# Patient Record
Sex: FEMALE | Race: WHITE | NOT HISPANIC OR LATINO | Employment: UNEMPLOYED | URBAN - METROPOLITAN AREA
[De-identification: names, ages, dates, MRNs, and addresses within clinical notes are randomized per-mention and may not be internally consistent; named-entity substitution may affect disease eponyms.]

---

## 2021-08-09 ENCOUNTER — HOSPITAL ENCOUNTER (EMERGENCY)
Facility: HOSPITAL | Age: 61
Discharge: HOME | End: 2021-08-09
Attending: EMERGENCY MEDICINE
Payer: MEDICAID

## 2021-08-09 VITALS
HEART RATE: 76 BPM | DIASTOLIC BLOOD PRESSURE: 73 MMHG | HEIGHT: 65 IN | WEIGHT: 159 LBS | OXYGEN SATURATION: 99 % | BODY MASS INDEX: 26.49 KG/M2 | RESPIRATION RATE: 20 BRPM | TEMPERATURE: 98 F | SYSTOLIC BLOOD PRESSURE: 128 MMHG

## 2021-08-09 DIAGNOSIS — U07.1 COVID-19 VIRUS INFECTION: Primary | ICD-10-CM

## 2021-08-09 LAB — SARS-COV-2 RDRP RESP QL NAA+PROBE: POSITIVE

## 2021-08-09 PROCEDURE — 99283 EMERGENCY DEPT VISIT LOW MDM: CPT | Mod: 25

## 2021-08-09 PROCEDURE — 99900035 HC TECH TIME PER 15 MIN (STAT)

## 2021-08-09 PROCEDURE — 25000242 PHARM REV CODE 250 ALT 637 W/ HCPCS: Performed by: EMERGENCY MEDICINE

## 2021-08-09 PROCEDURE — 94761 N-INVAS EAR/PLS OXIMETRY MLT: CPT

## 2021-08-09 PROCEDURE — 94640 AIRWAY INHALATION TREATMENT: CPT

## 2021-08-09 PROCEDURE — U0002 COVID-19 LAB TEST NON-CDC: HCPCS | Performed by: EMERGENCY MEDICINE

## 2021-08-09 PROCEDURE — 99900031 HC PATIENT EDUCATION (STAT)

## 2021-08-09 RX ORDER — ALBUTEROL SULFATE 90 UG/1
2 AEROSOL, METERED RESPIRATORY (INHALATION) EVERY 8 HOURS
Status: DISCONTINUED | OUTPATIENT
Start: 2021-08-09 | End: 2021-08-09 | Stop reason: HOSPADM

## 2021-08-09 RX ADMIN — ALBUTEROL SULFATE 2 PUFF: 90 AEROSOL, METERED RESPIRATORY (INHALATION) at 01:08

## 2021-08-12 ENCOUNTER — INFUSION (OUTPATIENT)
Dept: INFECTIOUS DISEASES | Facility: HOSPITAL | Age: 61
End: 2021-08-12
Attending: EMERGENCY MEDICINE
Payer: MEDICAID

## 2021-08-12 VITALS
RESPIRATION RATE: 14 BRPM | SYSTOLIC BLOOD PRESSURE: 135 MMHG | HEART RATE: 71 BPM | DIASTOLIC BLOOD PRESSURE: 73 MMHG | TEMPERATURE: 98 F | OXYGEN SATURATION: 99 %

## 2021-08-12 DIAGNOSIS — U07.1 COVID-19 VIRUS INFECTION: ICD-10-CM

## 2021-08-12 PROCEDURE — M0243 CASIRIVI AND IMDEVI INFUSION: HCPCS | Performed by: EMERGENCY MEDICINE

## 2021-08-12 PROCEDURE — 25000003 PHARM REV CODE 250: Performed by: EMERGENCY MEDICINE

## 2021-08-12 PROCEDURE — 63600175 PHARM REV CODE 636 W HCPCS: Performed by: EMERGENCY MEDICINE

## 2021-08-12 RX ORDER — SODIUM CHLORIDE 0.9 % (FLUSH) 0.9 %
10 SYRINGE (ML) INJECTION
Status: ACTIVE | OUTPATIENT
Start: 2021-08-12

## 2021-08-12 RX ORDER — ALBUTEROL SULFATE 90 UG/1
2 AEROSOL, METERED RESPIRATORY (INHALATION)
Status: DISPENSED | OUTPATIENT
Start: 2021-08-12

## 2021-08-12 RX ORDER — DIPHENHYDRAMINE HYDROCHLORIDE 50 MG/ML
25 INJECTION INTRAMUSCULAR; INTRAVENOUS ONCE AS NEEDED
Status: DISPENSED | OUTPATIENT
Start: 2021-08-12 | End: 2033-01-08

## 2021-08-12 RX ORDER — ONDANSETRON 4 MG/1
4 TABLET, ORALLY DISINTEGRATING ORAL ONCE AS NEEDED
Status: DISPENSED | OUTPATIENT
Start: 2021-08-12 | End: 2033-01-08

## 2021-08-12 RX ORDER — EPINEPHRINE 0.3 MG/.3ML
0.3 INJECTION SUBCUTANEOUS
Status: DISPENSED | OUTPATIENT
Start: 2021-08-12

## 2021-08-12 RX ORDER — ACETAMINOPHEN 325 MG/1
650 TABLET ORAL ONCE AS NEEDED
Status: DISPENSED | OUTPATIENT
Start: 2021-08-12 | End: 2033-01-08

## 2021-08-12 RX ADMIN — SODIUM CHLORIDE: 0.9 INJECTION, SOLUTION INTRAVENOUS at 01:08

## 2021-08-12 RX ADMIN — CASIRIVIMAB AND IMDEVIMAB 600 MG: 600; 600 INJECTION, SOLUTION, CONCENTRATE INTRAVENOUS at 02:08

## 2021-09-20 DIAGNOSIS — M54.50 LOW BACK PAIN: Primary | ICD-10-CM

## 2021-09-27 ENCOUNTER — HOSPITAL ENCOUNTER (OUTPATIENT)
Dept: RADIOLOGY | Facility: HOSPITAL | Age: 61
Discharge: HOME | End: 2021-09-27
Attending: NURSE PRACTITIONER
Payer: MEDICAID

## 2021-09-27 DIAGNOSIS — M54.50 LOW BACK PAIN: ICD-10-CM

## 2021-09-27 PROCEDURE — 72110 X-RAY EXAM L-2 SPINE 4/>VWS: CPT | Mod: TC,PO

## 2021-10-13 DIAGNOSIS — G43.709 CHRONIC MIGRAINE WITHOUT AURA WITHOUT STATUS MIGRAINOSUS, NOT INTRACTABLE: Primary | ICD-10-CM

## 2021-11-03 ENCOUNTER — HOSPITAL ENCOUNTER (OUTPATIENT)
Dept: RADIOLOGY | Facility: HOSPITAL | Age: 61
Discharge: HOME | End: 2021-11-03
Attending: NURSE PRACTITIONER
Payer: MEDICAID

## 2021-11-03 DIAGNOSIS — G43.709 CHRONIC MIGRAINE WITHOUT AURA WITHOUT STATUS MIGRAINOSUS, NOT INTRACTABLE: ICD-10-CM

## 2021-11-03 PROCEDURE — 70551 MRI BRAIN STEM W/O DYE: CPT | Mod: TC,PO

## 2023-11-20 ENCOUNTER — HOSPITAL ENCOUNTER (EMERGENCY)
Facility: HOSPITAL | Age: 63
Discharge: HOME | End: 2023-11-20
Attending: STUDENT IN AN ORGANIZED HEALTH CARE EDUCATION/TRAINING PROGRAM
Payer: MEDICAID

## 2023-11-20 VITALS
DIASTOLIC BLOOD PRESSURE: 72 MMHG | OXYGEN SATURATION: 97 % | RESPIRATION RATE: 18 BRPM | TEMPERATURE: 99 F | HEIGHT: 65 IN | HEART RATE: 89 BPM | BODY MASS INDEX: 33.32 KG/M2 | WEIGHT: 200 LBS | SYSTOLIC BLOOD PRESSURE: 141 MMHG
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
## 2023-11-20 DIAGNOSIS — S01.81XA LACERATION OF FOREHEAD, INITIAL ENCOUNTER: Primary | ICD-10-CM

## 2023-11-20 DIAGNOSIS — R07.9 CHEST PAIN: ICD-10-CM

## 2023-11-20 PROCEDURE — 99285 EMERGENCY DEPT VISIT HI MDM: CPT | Mod: 25

## 2023-11-20 PROCEDURE — 25000003 PHARM REV CODE 250: Performed by: EMERGENCY MEDICINE

## 2023-11-20 PROCEDURE — 90715 TDAP VACCINE 7 YRS/> IM: CPT | Performed by: EMERGENCY MEDICINE

## 2023-11-20 PROCEDURE — 93005 ELECTROCARDIOGRAM TRACING: CPT | Mod: 59 | Performed by: GENERAL PRACTICE

## 2023-11-20 PROCEDURE — 25000003 PHARM REV CODE 250: Performed by: STUDENT IN AN ORGANIZED HEALTH CARE EDUCATION/TRAINING PROGRAM

## 2023-11-20 PROCEDURE — 93010 ELECTROCARDIOGRAM REPORT: CPT | Mod: ,,, | Performed by: GENERAL PRACTICE

## 2023-11-20 PROCEDURE — 63600175 PHARM REV CODE 636 W HCPCS: Performed by: EMERGENCY MEDICINE

## 2023-11-20 PROCEDURE — 90471 IMMUNIZATION ADMIN: CPT | Performed by: EMERGENCY MEDICINE

## 2023-11-20 PROCEDURE — 93010 EKG 12-LEAD: ICD-10-PCS | Mod: ,,, | Performed by: GENERAL PRACTICE

## 2023-11-20 PROCEDURE — 12011 RPR F/E/E/N/L/M 2.5 CM/<: CPT

## 2023-11-20 RX ORDER — LIDOCAINE 50 MG/G
1 PATCH TOPICAL ONCE
Status: DISCONTINUED | OUTPATIENT
Start: 2023-11-20 | End: 2023-11-20 | Stop reason: HOSPADM

## 2023-11-20 RX ORDER — IBUPROFEN 400 MG/1
400 TABLET ORAL
Status: COMPLETED | OUTPATIENT
Start: 2023-11-20 | End: 2023-11-20

## 2023-11-20 RX ORDER — LIDOCAINE HYDROCHLORIDE 10 MG/ML
10 INJECTION, SOLUTION EPIDURAL; INFILTRATION; INTRACAUDAL; PERINEURAL
Status: COMPLETED | OUTPATIENT
Start: 2023-11-20 | End: 2023-11-20

## 2023-11-20 RX ORDER — ACETAMINOPHEN 500 MG
1000 TABLET ORAL
Status: COMPLETED | OUTPATIENT
Start: 2023-11-20 | End: 2023-11-20

## 2023-11-20 RX ADMIN — IBUPROFEN 400 MG: 400 TABLET, FILM COATED ORAL at 06:11

## 2023-11-20 RX ADMIN — LIDOCAINE HYDROCHLORIDE 100 MG: 10 SOLUTION INTRAVENOUS at 05:11

## 2023-11-20 RX ADMIN — ACETAMINOPHEN 1000 MG: 500 TABLET ORAL at 06:11

## 2023-11-20 RX ADMIN — CLOSTRIDIUM TETANI TOXOID ANTIGEN (FORMALDEHYDE INACTIVATED), CORYNEBACTERIUM DIPHTHERIAE TOXOID ANTIGEN (FORMALDEHYDE INACTIVATED), BORDETELLA PERTUSSIS TOXOID ANTIGEN (GLUTARALDEHYDE INACTIVATED), BORDETELLA PERTUSSIS FILAMENTOUS HEMAGGLUTININ ANTIGEN (FORMALDEHYDE INACTIVATED), BORDETELLA PERTUSSIS PERTACTIN ANTIGEN, AND BORDETELLA PERTUSSIS FIMBRIAE 2/3 ANTIGEN 0.5 ML: 5; 2; 2.5; 5; 3; 5 INJECTION, SUSPENSION INTRAMUSCULAR at 05:11

## 2023-11-20 RX ADMIN — LIDOCAINE 1 PATCH: 50 PATCH TOPICAL at 06:11

## 2023-11-20 NOTE — ED NOTES
C/O MIDSTERNAL CP. NO RD.  PULSES REGULAR. PROVIDER NOTIFIED.  STERILE DRESSING AT L EYEBROW AREA.

## 2023-11-20 NOTE — ED PROVIDER NOTES
Encounter Date: 11/20/2023       History     Chief Complaint   Patient presents with    Fall     TRIP AND FALL OVER DOG, NO LOC    Head Laceration     63-year-old female presents emergency department reports that she has a large white dog and white tile in her house she states that the dog was laying in a hallway and she had the lights off she did not see the dog and tripped and fell over the dog striking her head on tile she denies LOC she is complaining of a soreness sensation to her anterior chest wall that hurts when she breathes or turns , and reports it started after falling she denies any symptoms of chest pain or shortness breath preceding the fall suggestive of syncope,  she is also complaining of a mild headache.  She is a laceration over her left eyebrow unsure of her last tetanus      Review of patient's allergies indicates:  No Known Allergies  Past Medical History:   Diagnosis Date    Anxiety disorder, unspecified      Past Surgical History:   Procedure Laterality Date    CHOLECYSTECTOMY      HYSTERECTOMY       No family history on file.  Social History     Tobacco Use    Smoking status: Never    Smokeless tobacco: Never   Substance Use Topics    Alcohol use: Not Currently    Drug use: Never     Review of Systems   Constitutional: Negative.  Negative for fever.   HENT: Negative.     Respiratory:  Negative for cough, chest tightness and shortness of breath.    Cardiovascular:         Chest wall pain   Gastrointestinal: Negative.    Genitourinary: Negative.    Musculoskeletal: Negative.    Skin:  Positive for wound.   Neurological: Negative.    Hematological: Negative.    Psychiatric/Behavioral: Negative.     All other systems reviewed and are negative.      Physical Exam     Initial Vitals [11/20/23 1623]   BP Pulse Resp Temp SpO2   (!) 171/90 94 18 98.9 °F (37.2 °C) 97 %      MAP       --         Physical Exam    Nursing note and vitals reviewed.  Constitutional: She appears well-developed and  well-nourished.   HENT:   Head: Normocephalic.   Right Ear: External ear normal.   Left Ear: External ear normal.   Eyes: Conjunctivae and EOM are normal. Pupils are equal, round, and reactive to light.   Neck: Neck supple.   Normal range of motion.  Cardiovascular:  Normal rate, regular rhythm, normal heart sounds and intact distal pulses.           Pulmonary/Chest: Breath sounds normal. She exhibits tenderness.   Anterior chest wall tenderness near the sternum   Abdominal: Abdomen is soft. Bowel sounds are normal.   Musculoskeletal:         General: Normal range of motion.      Cervical back: Normal range of motion and neck supple.     Neurological: She is alert and oriented to person, place, and time. She has normal strength.   Skin: Skin is warm and dry.   Psychiatric: She has a normal mood and affect.         ED Course   Lac Repair    Date/Time: 11/20/2023 6:11 PM    Performed by: Madison Pickett FNP  Authorized by: Umu Venegas MD    Consent:     Consent obtained:  Verbal    Consent given by:  Patient    Risks discussed:  Infection, need for additional repair, nerve damage, pain, poor cosmetic result, poor wound healing, retained foreign body and vascular damage  Universal protocol:     Patient identity confirmed:  Verbally with patient  Anesthesia:     Anesthesia method:  Local infiltration    Local anesthetic:  Lidocaine 1% w/o epi  Laceration details:     Location:  Face    Face location:  L eyebrow    Length (cm):  2.5    Depth (mm):  0.5  Pre-procedure details:     Preparation:  Patient was prepped and draped in usual sterile fashion and imaging obtained to evaluate for foreign bodies  Exploration:     Imaging obtained: x-ray      Imaging outcome: foreign body not noted      Wound exploration: wound explored through full range of motion and entire depth of wound visualized      Wound extent: no areolar tissue violation noted, no fascia violation noted, no foreign bodies/material noted and no muscle  damage noted      Contaminated: no    Treatment:     Amount of cleaning:  Standard    Irrigation solution:  Sterile water    Irrigation method:  Syringe    Debridement:  None  Skin repair:     Repair method:  Sutures    Suture size:  5-0    Suture technique:  Simple interrupted    Number of sutures:  6  Approximation:     Approximation:  Close  Post-procedure details:     Procedure completion:  Tolerated well, no immediate complications    Labs Reviewed - No data to display       Imaging Results              X-Ray Chest PA And Lateral (Final result)  Result time 11/20/23 19:18:33      Final result by Brandi Bernal DO (11/20/23 19:18:33)                   Narrative:    PA and lateral chest radiograph: 11/20/2023 7:18 PM CST    History: 63 years  old Female with fall, chest pain.    Comparison: Chest radiograph performed 8/9/2021    Findings: The cardiomediastinal silhouette is at the upper limits of normal in size.    No pneumothorax is seen.    No acute airspace opacities are seen.    No discrete pleural effusion is apparent.    Evidence of fibrosis within the lung bases. There are also emphysematous changes.    Impression: No acute airspace opacities are seen.          Electronically signed by:  Brandi Bernal DO  11/20/2023 07:18 PM CST Workstation: [id number]                                     CT Cervical Spine Without Contrast (Final result)  Result time 11/20/23 18:10:02      Final result by Prabhu Walls MD (11/20/23 18:10:02)                   Narrative:    CMS MANDATED QUALITY DATA - CT RADIATION - 436    One or more of the following dose-optimizing techniques was utilized for this exam: automated exposure control, adjustment of the mA and/or kV according to patient size, and/or use of iterative reconstruction technique.        HISTORY: Blunt facial trauma. Pain to the neck and head.    TECHNIQUE: Serial axial images were obtained from the skull base through the upper thoracic spine without  intravenous contrast. Coronal and sagittal reconstructions were performed. Patient received 607 mGy-cm of radiation exposure from this exam.    COMPARISON: No previous study available for comparison.    FINDINGS: Degenerative changes are noted throughout cervical spine with multilevel disc space narrowing and degenerative changes of the endplates, greatest in the mid and lower cervical spine. Multilevel degenerative changes of the facet joints are present. Osteophyte/disc complex is noted at the C5-6 and C6-7 level. No prevertebral soft tissue swelling is noted. No evidence of acute cervical spine fracture or listhesis is identified. The odontoid process appears intact.    IMPRESSION:  1. Multilevel degenerative changes throughout the cervical spine.  2. No evidence of acute cervical spine fracture or listhesis.      Electronically signed by:  Prabhu Walls MD  11/20/2023 06:10 PM [facility] Workstation: [id number]                                     CT Head Without Contrast (Final result)  Result time 11/20/23 18:04:34      Final result by Prabhu Walls MD (11/20/23 18:04:34)                   Narrative:    CMS MANDATED QUALITY DATA - CT RADIATION - 436    One or more of the following dose-optimizing techniques was utilized for this exam: automated exposure control, adjustment of the mA and/or kV according to patient size, and/or use of iterative reconstruction technique.        HISTORY:  Blunt facial trauma. Pain and head and neck.    TECHNIQUE:  CT images were obtained from the skull base to the vertex without the administration of intravenous contrast. Coronal and sagittal reconstructions were performed. The patient received approximate 607 mGy-cm of radiation exposure from this exam.    COMPARISON: No previous study available for comparison.    FINDINGS:  Arterial calcification is present at skull base. Mild atrophy and chronic-appearing periventricular white matter ischemic changes are noted.  The basal  cisterns are patent.  No mass effect or midline shift is seen.  No evidence of acute intracranial hemorrhage, mass, or acute infarct is seen.  The gray/white matter differentiation is preserved.  There are no intra- or extra-axial fluid collections identified.  Paranasal sinuses and mastoid air cells are clear.  Visualized portions of the orbits and osseous structures are unremarkable. Left-sided supraorbital soft tissue swelling and emphysema is present.    IMPRESSION:    1. No acute intracranial injury seen.          Electronically signed by:  Prabhu Walls MD  11/20/2023 06:04 PM [facility] Workstation: [id number]                                     Medications   LIDOcaine 5 % patch 1 patch (1 patch Transdermal Patch Applied 11/20/23 1854)   LIDOcaine (PF) 10 mg/ml (1%) injection 100 mg (100 mg Infiltration Given 11/20/23 1704)   Tdap vaccine injection 0.5 mL (0.5 mLs Intramuscular Given 11/20/23 1704)   acetaminophen tablet 1,000 mg (1,000 mg Oral Given 11/20/23 1853)   ibuprofen tablet 400 mg (400 mg Oral Given 11/20/23 1854)     Medical Decision Making  Amount and/or Complexity of Data Reviewed  Radiology: ordered.    Risk  OTC drugs.  Prescription drug management.    63-year-old presenting after mechanical fall    Vitals within acceptable limits on presentation    Differential includes fracture, laceration, intracranial bleeding    Primary, secondary, tertiary within acceptable limits except for tenderness to palpation in the sternal area to the chest and  laceration to the left forehead which was repaired as per procedure note.  CT head, CT C-spine, chest x-ray within acceptable limits.  Based on mechanism of fall no troponin or EKG at this time.  Discussed strict return precautions and need for close follow up with PCP within 1-2 days for re-evaluation as well as 1st suture removal within 5-7 days discussed monitoring for signs of infection.  Tdap updated  Umu Venegas MD  Emergency Medicine Staff  Physician  7:26 PM                                  Clinical Impression:  Final diagnoses:  [R07.9] Chest pain  [W19.XXXA] Fall  [S01.81XA] Laceration of forehead, initial encounter (Primary)          ED Disposition Condition    Discharge Stable          ED Prescriptions    None       Follow-up Information       Follow up With Specialties Details Why Contact Info Additional Information    [facility] - Emergency Dept Emergency Medicine Go to  Return to an emergency department immediately if you develop persisting or worsening symptoms or with any new symptoms such as fevers, chills, inability to eat or drink, uncontrollable pain, headaches, chagnes in vision, nausea, vomiting, stomach pain [address] [facility] [id number] [phone] 1st floor    Your Primary Care Doctor  Go in 3 days For followup of Emergency Room visit for fall, head laceration               Umu Venegas MD  11/20/23 5246

## 2023-11-21 NOTE — ED NOTES
Pt came to ER for trip and fall over dog. Pt received 6 stitches above L eye. Pt denies LOC. Pt while in CC1, began complaining of CP. Pt states that it is in the center of her chest and hurts worse when it is pushed on. Pt denies pain radiation of pain. Pt is AAOx3, PERRL, LS clear bilat throughout.

## 2023-11-28 ENCOUNTER — HOSPITAL ENCOUNTER (EMERGENCY)
Facility: HOSPITAL | Age: 63
Discharge: LEFT BEFORE DISCHARGE | End: 2023-11-28
Attending: EMERGENCY MEDICINE
Payer: MEDICAID

## 2023-11-28 VITALS
DIASTOLIC BLOOD PRESSURE: 81 MMHG | SYSTOLIC BLOOD PRESSURE: 149 MMHG | HEART RATE: 67 BPM | OXYGEN SATURATION: 100 % | TEMPERATURE: 99 F

## 2023-11-28 DIAGNOSIS — Z48.02 VISIT FOR SUTURE REMOVAL: ICD-10-CM

## 2023-11-28 DIAGNOSIS — Z53.29 LEFT AGAINST MEDICAL ADVICE: Primary | ICD-10-CM

## 2023-11-28 DIAGNOSIS — R07.89 CHEST DISCOMFORT: ICD-10-CM

## 2023-11-28 DIAGNOSIS — R06.02 SHORTNESS OF BREATH: ICD-10-CM

## 2023-11-28 PROCEDURE — 99283 EMERGENCY DEPT VISIT LOW MDM: CPT

## 2023-11-28 NOTE — ED PROVIDER NOTES
Encounter Date: 11/28/2023       History     Chief Complaint   Patient presents with    Suture / Staple Removal    Chest Pain     Pt request suture removal L eyebrow. She c/o chest wall pain x 1 week, intermittent. She states discomfort continues after fall x 1 week ago.      63-year-old female presents emergency department here for suture removal over the left eyebrow.  Patient had a mechanical trip and fall 1 week ago when she tripped over her dog.  She states that she hit her head on the floor sustaining laceration.  She also reports that she hit her chest wall over the dog when she fell complaint of sternal pain at that time had a negative x-ray of her chest still complaining of pain to her chest however does not wish to have EKG or any further workup.  Patient states that she just wishes to have her sutures taken out.      Review of patient's allergies indicates:  No Known Allergies  Past Medical History:   Diagnosis Date    Anxiety disorder, unspecified      Past Surgical History:   Procedure Laterality Date    CHOLECYSTECTOMY      HYSTERECTOMY       No family history on file.  Social History     Tobacco Use    Smoking status: Never    Smokeless tobacco: Never   Substance Use Topics    Alcohol use: Not Currently    Drug use: Never     Review of Systems   Constitutional: Negative.    HENT: Negative.     Respiratory: Negative.     Cardiovascular:  Positive for chest pain.   Gastrointestinal: Negative.    Genitourinary: Negative.    Musculoskeletal: Negative.    Neurological: Negative.    Hematological: Negative.    Psychiatric/Behavioral: Negative.     All other systems reviewed and are negative.      Physical Exam     Initial Vitals [11/28/23 0933]   BP Pulse Resp Temp SpO2   (!) 149/81 67 -- 98.5 °F (36.9 °C) 100 %      MAP       --         Physical Exam    Nursing note and vitals reviewed.  Constitutional: She appears well-developed and well-nourished.   HENT:   Head: Normocephalic and atraumatic.    Laceration of the left eyebrow with sutures intact no circumferential swelling there is old bruising noted under the left eye, no ocular entrapment   Eyes: Conjunctivae and EOM are normal. Pupils are equal, round, and reactive to light.   Neck: Neck supple.   Normal range of motion.  Pulmonary/Chest: She exhibits tenderness.   Patient complains of chest wall tenderness to the sternal area, pain is reproducible with palpation   Abdominal: Abdomen is soft. Bowel sounds are normal.   Musculoskeletal:      Cervical back: Normal range of motion and neck supple.     Neurological: She is alert. She has normal strength. GCS score is 15. GCS eye subscore is 4. GCS verbal subscore is 5. GCS motor subscore is 6.   Skin: Skin is warm. No rash noted.   Psychiatric: She has a normal mood and affect.         ED Course   Suture Removal    Date/Time: 11/28/2023 10:10 AM  Location procedure was performed: [facility] EMERGENCY DEPARTMENT    Performed by: Madison Pickett FNP  Authorized by: Simón Ramirez MD  Body area: head/neck  Location details: left eyebrow  Wound Appearance: clean, well healed, nontender and no drainage  Sutures Removed: 6  Post-removal: Steri-Strips applied  Facility: sutures placed in this facility  Patient tolerance: Patient tolerated the procedure well with no immediate complications        Labs Reviewed   CBC W/ AUTO DIFFERENTIAL   COMPREHENSIVE METABOLIC PANEL   TROPONIN I HIGH SENSITIVITY   B-TYPE NATRIURETIC PEPTIDE   MAGNESIUM          Imaging Results              X-Ray Chest AP Portable (No Result on File)                      Medications - No data to display  Medical Decision Making  Amount and/or Complexity of Data Reviewed  Labs: ordered.  Radiology: ordered.                                      Clinical Impression:  Final diagnoses:  [R07.89] Chest discomfort  [R06.02] Shortness of breath  [Z53.29] Left against medical advice (Primary)  [Z48.02] Visit for suture removal          ED Disposition  Condition    AMA Stable                Madison Pickett, P  11/28/23 1016

## 2023-11-28 NOTE — DISCHARGE INSTRUCTIONS
Please follow-up and [facility] as directed for further evaluation definitive care  Return if condition becomes worse for any concerns

## 2024-03-30 NOTE — PROGRESS NOTES
SUBJECTIVE:      Patient ID: Geovanna Harvey is a 63 y.o. female.    Chief Complaint: Establish Care    Patient is here today to establish care. She has not seen a primary care provider in over 15 years. She is interested in routine screenings and labs. She had a total hysterectomy including ovaries and stopped following with gyn 10 years ago. She is following with psyc in [place] and says she is doing well on her current meds.she says she has a mass in her upper abdomen that has been there for 40 years but over the past year it feels bigger. She has occasional heartburn that is relieved with pepcid, has also had that for years        Past Surgical History:   Procedure Laterality Date    CHOLECYSTECTOMY      HYSTERECTOMY       Family History   Problem Relation Age of Onset    Kidney disease Mother     Diabetes Mother     Depression Father       Social History     Socioeconomic History    Marital status:    Tobacco Use    Smoking status: Never    Smokeless tobacco: Never   Substance and Sexual Activity    Alcohol use: Not Currently    Drug use: Never   Social History Narrative    ** Merged History Encounter **          Current Outpatient Medications   Medication Sig Dispense Refill    citalopram (CELEXA) 40 MG tablet       traZODone (DESYREL) 50 MG tablet Take 50 mg by mouth every evening.      famotidine (PEPCID) 20 MG tablet Take 1 tablet (20 mg total) by mouth 2 (two) times daily. 60 tablet 1     Current Facility-Administered Medications   Medication Dose Route Frequency Provider Last Rate Last Admin    acetaminophen tablet 650 mg  650 mg Oral Once PRN Hever Hathaway MD        albuterol inhaler 2 puff  2 puff Inhalation Q20 Min PRN Hever Hathaway MD        diphenhydrAMINE injection 25 mg  25 mg Intravenous Once PRN Hever Hathaway MD        EPINEPHrine (EPIPEN) 0.3 mg/0.3 mL pen injection 0.3 mg  0.3 mg Intramuscular PRN Hever Hathaway MD        methylPREDNISolone sodium  "succinate injection 40 mg  40 mg Intravenous Once PRN Hever Hathaway MD        ondansetron disintegrating tablet 4 mg  4 mg Oral Once PRN Hever Hathaway MD        sodium chloride 0.9% 500 mL flush bag   Intravenous PRN Hever Hathaway MD   Stopped at 08/12/21 1400    sodium chloride 0.9% flush 10 mL  10 mL Intravenous PRN Hever Hathaway MD         Review of patient's allergies indicates:  No Known Allergies   Past Medical History:   Diagnosis Date    Anxiety disorder, unspecified     Insomnia      Past Surgical History:   Procedure Laterality Date    CHOLECYSTECTOMY      HYSTERECTOMY         Review of Systems   Constitutional:  Negative for appetite change, chills, diaphoresis and unexpected weight change.   HENT:  Negative for ear discharge, hearing loss, trouble swallowing and voice change.    Eyes:  Negative for photophobia and pain.   Respiratory:  Negative for chest tightness, shortness of breath and stridor.    Cardiovascular:  Negative for chest pain and palpitations.   Gastrointestinal:  Negative for abdominal pain, blood in stool and vomiting.   Endocrine: Negative for cold intolerance and heat intolerance.   Genitourinary:  Negative for difficulty urinating and flank pain.   Musculoskeletal:  Negative for joint swelling and neck stiffness.   Skin:  Negative for pallor.   Neurological:  Negative for dizziness, speech difficulty, weakness, light-headedness and headaches.   Hematological:  Does not bruise/bleed easily.   Psychiatric/Behavioral:  Negative for confusion, dysphoric mood and sleep disturbance. The patient is not nervous/anxious.       OBJECTIVE:      Vitals:    04/01/24 0901   BP: 106/70   Pulse: 92   SpO2: 98%   Weight: 79.8 kg (176 lb)   Height: 5' 5" (1.651 m)     Physical Exam  Vitals and nursing note reviewed.   Constitutional:       General: She is not in acute distress.     Appearance: She is well-developed.   HENT:      Head: Normocephalic and atraumatic.      Right " Ear: Tympanic membrane normal.      Left Ear: Tympanic membrane normal.      Nose: Nose normal.      Mouth/Throat:      Pharynx: Uvula midline.   Eyes:      General: Lids are normal.      Conjunctiva/sclera: Conjunctivae normal.      Pupils: Pupils are equal, round, and reactive to light.      Right eye: Pupil is round and reactive.      Left eye: Pupil is round and reactive.   Neck:      Thyroid: No thyromegaly.      Vascular: No JVD.      Trachea: Trachea normal.   Cardiovascular:      Rate and Rhythm: Normal rate and regular rhythm.      Pulses: Normal pulses.      Heart sounds: Normal heart sounds. No murmur heard.  Pulmonary:      Effort: Pulmonary effort is normal. No tachypnea or respiratory distress.      Breath sounds: Normal breath sounds. No wheezing, rhonchi or rales.   Abdominal:      General: Bowel sounds are normal.      Palpations: Abdomen is soft.      Tenderness: There is no abdominal tenderness.       Musculoskeletal:         General: Normal range of motion.      Cervical back: Normal range of motion and neck supple.      Right lower leg: No edema.      Left lower leg: No edema.   Lymphadenopathy:      Cervical: No cervical adenopathy.   Skin:     General: Skin is warm and dry.      Findings: No rash.   Neurological:      Mental Status: She is alert and oriented to person, place, and time.   Psychiatric:         Mood and Affect: Mood normal.         Speech: Speech normal.         Behavior: Behavior normal. Behavior is cooperative.         Thought Content: Thought content normal.         Judgment: Judgment normal.        Last visit note, most recent available labs, and health maintenance reviewed    Assessment:       1. Preventative health care    2. Need for hepatitis C screening test    3. Screen for colon cancer    4. Screening mammogram, encounter for    5. Screening for diabetes mellitus (DM)    6. Epigastric mass    7. Heartburn        Plan:       Preventative health care  -     CBC Auto  Differential; Future; Expected date: 04/15/2024  -     Comprehensive Metabolic Panel; Future; Expected date: 04/15/2024  -     Lipid Panel; Future; Expected date: 04/15/2024  -     TSH; Future; Expected date: 05/13/2024  -     Urinalysis, Reflex to Urine Culture Urine, Clean Catch; Future; Expected date: 04/01/2024  Counseled on age and gender appropriate medical preventative services, including cancer screenings, immunizations, overall nutritional health, need for a consistent exercise regimen and an overall push towards maintaining a vigorous and active lifestyle.     Need for hepatitis C screening test  -     Hepatitis C Antibody; Future; Expected date: 04/15/2024    Screen for colon cancer  -     Ambulatory referral/consult to Gastroenterology; Future; Expected date: 04/08/2024    Screening mammogram, encounter for  -     Mammo Digital Screening Bilat w/ Rajeev; Future; Expected date: 04/01/2024    Screening for diabetes mellitus (DM)  -     Hemoglobin A1C; Future; Expected date: 04/15/2024    Epigastric mass  -     US Abdomen Complete; Future; Expected date: 04/01/2024    Heartburn  -     famotidine (PEPCID) 20 MG tablet; Take 1 tablet (20 mg total) by mouth 2 (two) times daily.  Dispense: 60 tablet; Refill: 1        Follow up in about 5 weeks (around 5/6/2024) for heartburn.      4/1/2024 VERA Sequeira, FNP

## 2024-04-01 ENCOUNTER — OFFICE VISIT (OUTPATIENT)
Dept: FAMILY MEDICINE | Facility: CLINIC | Age: 64
End: 2024-04-01
Payer: MEDICAID

## 2024-04-01 VITALS
OXYGEN SATURATION: 98 % | HEIGHT: 65 IN | WEIGHT: 176 LBS | SYSTOLIC BLOOD PRESSURE: 106 MMHG | HEART RATE: 92 BPM | DIASTOLIC BLOOD PRESSURE: 70 MMHG | BODY MASS INDEX: 29.32 KG/M2

## 2024-04-01 DIAGNOSIS — Z12.11 SCREEN FOR COLON CANCER: ICD-10-CM

## 2024-04-01 DIAGNOSIS — Z13.1 SCREENING FOR DIABETES MELLITUS (DM): ICD-10-CM

## 2024-04-01 DIAGNOSIS — Z12.31 SCREENING MAMMOGRAM, ENCOUNTER FOR: ICD-10-CM

## 2024-04-01 DIAGNOSIS — Z11.59 NEED FOR HEPATITIS C SCREENING TEST: ICD-10-CM

## 2024-04-01 DIAGNOSIS — R19.06 EPIGASTRIC MASS: ICD-10-CM

## 2024-04-01 DIAGNOSIS — Z00.00 PREVENTATIVE HEALTH CARE: Primary | ICD-10-CM

## 2024-04-01 DIAGNOSIS — R12 HEARTBURN: ICD-10-CM

## 2024-04-01 PROCEDURE — 1159F MED LIST DOCD IN RCRD: CPT | Mod: CPTII,S$GLB,, | Performed by: NURSE PRACTITIONER

## 2024-04-01 PROCEDURE — 1160F RVW MEDS BY RX/DR IN RCRD: CPT | Mod: CPTII,S$GLB,, | Performed by: NURSE PRACTITIONER

## 2024-04-01 PROCEDURE — 99386 PREV VISIT NEW AGE 40-64: CPT | Mod: S$GLB,,, | Performed by: NURSE PRACTITIONER

## 2024-04-01 PROCEDURE — 3074F SYST BP LT 130 MM HG: CPT | Mod: CPTII,S$GLB,, | Performed by: NURSE PRACTITIONER

## 2024-04-01 PROCEDURE — 3008F BODY MASS INDEX DOCD: CPT | Mod: CPTII,S$GLB,, | Performed by: NURSE PRACTITIONER

## 2024-04-01 PROCEDURE — 3078F DIAST BP <80 MM HG: CPT | Mod: CPTII,S$GLB,, | Performed by: NURSE PRACTITIONER

## 2024-04-01 RX ORDER — FAMOTIDINE 20 MG/1
20 TABLET, FILM COATED ORAL 2 TIMES DAILY
Qty: 60 TABLET | Refills: 1 | Status: SHIPPED | OUTPATIENT
Start: 2024-04-01 | End: 2024-05-14 | Stop reason: SDUPTHER

## 2024-04-01 RX ORDER — TRAZODONE HYDROCHLORIDE 50 MG/1
50 TABLET ORAL NIGHTLY
COMMUNITY
Start: 2024-03-18

## 2024-04-17 ENCOUNTER — TELEPHONE (OUTPATIENT)
Dept: FAMILY MEDICINE | Facility: CLINIC | Age: 64
End: 2024-04-17
Payer: MEDICAID

## 2024-04-17 ENCOUNTER — HOSPITAL ENCOUNTER (OUTPATIENT)
Dept: RADIOLOGY | Facility: HOSPITAL | Age: 64
Discharge: HOME | End: 2024-04-17
Attending: NURSE PRACTITIONER
Payer: MEDICAID

## 2024-04-17 DIAGNOSIS — R92.8 ABNORMAL MAMMOGRAM: Primary | ICD-10-CM

## 2024-04-17 DIAGNOSIS — Z12.31 SCREENING MAMMOGRAM, ENCOUNTER FOR: ICD-10-CM

## 2024-04-17 DIAGNOSIS — R19.06 EPIGASTRIC MASS: ICD-10-CM

## 2024-04-17 PROCEDURE — 77067 SCR MAMMO BI INCL CAD: CPT | Mod: 26,,, | Performed by: RADIOLOGY

## 2024-04-17 PROCEDURE — 77063 BREAST TOMOSYNTHESIS BI: CPT | Mod: 26,,, | Performed by: RADIOLOGY

## 2024-04-17 PROCEDURE — 76700 US EXAM ABDOM COMPLETE: CPT | Mod: 26,,, | Performed by: RADIOLOGY

## 2024-04-17 PROCEDURE — 76700 US EXAM ABDOM COMPLETE: CPT | Mod: TC,PO

## 2024-04-17 PROCEDURE — 77067 SCR MAMMO BI INCL CAD: CPT | Mod: TC,PO

## 2024-04-17 PROCEDURE — 77063 BREAST TOMOSYNTHESIS BI: CPT | Mod: TC,PO

## 2024-04-17 NOTE — TELEPHONE ENCOUNTER
----- Message from Vasile Cazares NP sent at 4/17/2024 12:43 PM CDT -----  Prior cholecystectomy otherwise negative abdominal ultrasound

## 2024-04-17 NOTE — TELEPHONE ENCOUNTER
----- Message from Vasile Cazares NP sent at 4/17/2024 12:43 PM CDT -----  Mammo shows a nodule in the upper outer right breast and nodular density in the upper outer left breast.  Bilateral spot compression views and ultrasound is recommended. Please send back to me after notifying pt

## 2024-04-18 ENCOUNTER — PATIENT MESSAGE (OUTPATIENT)
Dept: FAMILY MEDICINE | Facility: CLINIC | Age: 64
End: 2024-04-18
Payer: MEDICAID

## 2024-04-25 ENCOUNTER — HOSPITAL ENCOUNTER (OUTPATIENT)
Dept: RADIOLOGY | Facility: HOSPITAL | Age: 64
Discharge: HOME | End: 2024-04-25
Attending: NURSE PRACTITIONER
Payer: MEDICAID

## 2024-04-25 DIAGNOSIS — R92.8 ABNORMAL MAMMOGRAM: ICD-10-CM

## 2024-04-25 PROCEDURE — 77066 DX MAMMO INCL CAD BI: CPT | Mod: 26,,, | Performed by: RADIOLOGY

## 2024-04-25 PROCEDURE — 77062 BREAST TOMOSYNTHESIS BI: CPT | Mod: TC,PO

## 2024-04-25 PROCEDURE — 77062 BREAST TOMOSYNTHESIS BI: CPT | Mod: 26,,, | Performed by: RADIOLOGY

## 2024-04-25 PROCEDURE — 76642 ULTRASOUND BREAST LIMITED: CPT | Mod: 26,50,, | Performed by: RADIOLOGY

## 2024-04-25 PROCEDURE — 76642 ULTRASOUND BREAST LIMITED: CPT | Mod: TC,50,PO

## 2024-05-14 ENCOUNTER — OFFICE VISIT (OUTPATIENT)
Dept: FAMILY MEDICINE | Facility: CLINIC | Age: 64
End: 2024-05-14
Payer: MEDICAID

## 2024-05-14 VITALS — WEIGHT: 162 LBS | HEIGHT: 65 IN | HEART RATE: 94 BPM | BODY MASS INDEX: 26.99 KG/M2 | OXYGEN SATURATION: 98 %

## 2024-05-14 DIAGNOSIS — R10.13 EPIGASTRIC PAIN: ICD-10-CM

## 2024-05-14 DIAGNOSIS — R82.90 ABNORMAL URINALYSIS: Primary | ICD-10-CM

## 2024-05-14 DIAGNOSIS — B35.3 TINEA PEDIS OF LEFT FOOT: ICD-10-CM

## 2024-05-14 DIAGNOSIS — R12 HEARTBURN: ICD-10-CM

## 2024-05-14 DIAGNOSIS — R19.06 EPIGASTRIC MASS: ICD-10-CM

## 2024-05-14 PROCEDURE — 1160F RVW MEDS BY RX/DR IN RCRD: CPT | Mod: CPTII,S$GLB,, | Performed by: NURSE PRACTITIONER

## 2024-05-14 PROCEDURE — 3044F HG A1C LEVEL LT 7.0%: CPT | Mod: CPTII,S$GLB,, | Performed by: NURSE PRACTITIONER

## 2024-05-14 PROCEDURE — 3008F BODY MASS INDEX DOCD: CPT | Mod: CPTII,S$GLB,, | Performed by: NURSE PRACTITIONER

## 2024-05-14 PROCEDURE — 99214 OFFICE O/P EST MOD 30 MIN: CPT | Mod: S$GLB,,, | Performed by: NURSE PRACTITIONER

## 2024-05-14 PROCEDURE — 1159F MED LIST DOCD IN RCRD: CPT | Mod: CPTII,S$GLB,, | Performed by: NURSE PRACTITIONER

## 2024-05-14 RX ORDER — PHENTERMINE HYDROCHLORIDE 37.5 MG/1
37.5 TABLET ORAL EVERY MORNING
COMMUNITY
Start: 2024-04-27

## 2024-05-14 RX ORDER — FAMOTIDINE 20 MG/1
20 TABLET, FILM COATED ORAL 2 TIMES DAILY
Qty: 60 TABLET | Refills: 1 | Status: SHIPPED | OUTPATIENT
Start: 2024-05-14

## 2024-05-14 NOTE — PROGRESS NOTES
SUBJECTIVE:      Patient ID: Geovanna Harvey is a 63 y.o. female.    Chief Complaint: Heartburn (Follow up)    Patient is here today to f/u on gerd and review labs. Currently on pepcid for GERD and feels it has helped. Abdominal u/s wnl. She did not schedule with gastro yet but says she will. She is asking for a referral to podiatry for a fungus on her left toes        Past Surgical History:   Procedure Laterality Date    CHOLECYSTECTOMY      HYSTERECTOMY       Family History   Problem Relation Name Age of Onset    Kidney disease Mother      Diabetes Mother      Depression Father        Social History     Socioeconomic History    Marital status:    Tobacco Use    Smoking status: Never    Smokeless tobacco: Never   Substance and Sexual Activity    Alcohol use: Not Currently    Drug use: Never   Social History Narrative    ** Merged History Encounter **          Current Outpatient Medications   Medication Sig Dispense Refill    citalopram (CELEXA) 40 MG tablet       phentermine (ADIPEX-P) 37.5 mg tablet Take 37.5 mg by mouth every morning.      traZODone (DESYREL) 50 MG tablet Take 50 mg by mouth every evening.      famotidine (PEPCID) 20 MG tablet Take 1 tablet (20 mg total) by mouth 2 (two) times daily. 60 tablet 1     Current Facility-Administered Medications   Medication Dose Route Frequency Provider Last Rate Last Admin    acetaminophen tablet 650 mg  650 mg Oral Once PRN Hever Hathaway MD        albuterol inhaler 2 puff  2 puff Inhalation Q20 Min PRN Hever Hathaway MD        diphenhydrAMINE injection 25 mg  25 mg Intravenous Once PRN Hever Hathaway MD        EPINEPHrine (EPIPEN) 0.3 mg/0.3 mL pen injection 0.3 mg  0.3 mg Intramuscular PRN Hever Hathaway MD        methylPREDNISolone sodium succinate injection 40 mg  40 mg Intravenous Once PRN Hever Hathaway MD        ondansetron disintegrating tablet 4 mg  4 mg Oral Once PRN Hever Hathaway MD        sodium chloride  "0.9% 500 mL flush bag   Intravenous PRN Hever Hathaway MD   Stopped at 08/12/21 1400    sodium chloride 0.9% flush 10 mL  10 mL Intravenous PRN Hever Hathaway MD         Review of patient's allergies indicates:  No Known Allergies   Past Medical History:   Diagnosis Date    Anxiety disorder, unspecified     Insomnia      Past Surgical History:   Procedure Laterality Date    CHOLECYSTECTOMY      HYSTERECTOMY         Review of Systems   Constitutional:  Positive for diaphoresis. Negative for appetite change, chills and unexpected weight change.   HENT:  Negative for ear discharge, hearing loss, trouble swallowing and voice change.    Eyes:  Negative for photophobia and pain.   Respiratory:  Negative for chest tightness, shortness of breath, wheezing and stridor.    Cardiovascular:  Negative for chest pain and palpitations.   Gastrointestinal:  Negative for abdominal pain, blood in stool and vomiting.   Endocrine: Negative for cold intolerance and heat intolerance.   Genitourinary:  Negative for difficulty urinating and flank pain.   Musculoskeletal:  Negative for joint swelling and neck stiffness.   Skin:  Negative for pallor.   Neurological:  Negative for dizziness, speech difficulty, weakness, light-headedness and headaches.   Hematological:  Does not bruise/bleed easily.   Psychiatric/Behavioral:  Negative for confusion, dysphoric mood, self-injury, sleep disturbance and suicidal ideas. The patient is not nervous/anxious.       OBJECTIVE:      Vitals:    05/14/24 1439   BP: (P) 120/66   Pulse: 94   SpO2: 98%   Weight: 73.5 kg (162 lb)   Height: 5' 5" (1.651 m)     Physical Exam  Vitals and nursing note reviewed.   Constitutional:       General: She is not in acute distress.     Appearance: She is well-developed.   HENT:      Head: Normocephalic and atraumatic.      Right Ear: Tympanic membrane normal.      Left Ear: Tympanic membrane normal.      Nose: Nose normal.      Mouth/Throat:      Pharynx: Uvula " midline.   Eyes:      General: Lids are normal.      Conjunctiva/sclera: Conjunctivae normal.      Pupils: Pupils are equal, round, and reactive to light.      Right eye: Pupil is round and reactive.      Left eye: Pupil is round and reactive.   Neck:      Thyroid: No thyromegaly.      Vascular: No JVD.      Trachea: Trachea normal.   Cardiovascular:      Rate and Rhythm: Normal rate and regular rhythm.      Pulses: Normal pulses.      Heart sounds: Normal heart sounds. No murmur heard.  Pulmonary:      Effort: Pulmonary effort is normal. No tachypnea or respiratory distress.      Breath sounds: Normal breath sounds. No wheezing, rhonchi or rales.   Abdominal:      General: Bowel sounds are normal.      Palpations: Abdomen is soft.      Tenderness: There is abdominal tenderness in the epigastric area.   Musculoskeletal:         General: Normal range of motion.      Cervical back: Normal range of motion and neck supple.      Right lower leg: No edema.      Left lower leg: No edema.   Feet:      Left foot:      Skin integrity: Callus and dry skin present.      Toenail Condition: Left toenails are abnormally thick.   Lymphadenopathy:      Cervical: No cervical adenopathy.   Skin:     General: Skin is warm and dry.      Findings: No rash.   Neurological:      Mental Status: She is alert and oriented to person, place, and time.   Psychiatric:         Mood and Affect: Mood normal.         Speech: Speech normal.         Behavior: Behavior normal. Behavior is cooperative.         Thought Content: Thought content normal.         Judgment: Judgment normal.          Lab Visit on 04/17/2024   Component Date Value Ref Range Status    WBC 04/17/2024 4.48  3.90 - 12.70 K/uL Final    RBC 04/17/2024 3.96 (L)  4.00 - 5.40 M/uL Final    Hemoglobin 04/17/2024 13.4  12.0 - 16.0 g/dL Final    Hematocrit 04/17/2024 40.8  37.0 - 48.5 % Final    MCV 04/17/2024 103 (H)  82 - 98 fL Final    MCH 04/17/2024 33.8 (H)  27.0 - 31.0 pg Final     MCHC 04/17/2024 32.8  32.0 - 36.0 g/dL Final    RDW 04/17/2024 12.5  11.5 - 14.5 % Final    Platelets 04/17/2024 246  150 - 450 K/uL Final    MPV 04/17/2024 10.2  9.2 - 12.9 fL Final    Immature Granulocytes 04/17/2024 0.0  0.0 - 0.5 % Final    Gran # (ANC) 04/17/2024 1.8  1.8 - 7.7 K/uL Final    Immature Grans (Abs) 04/17/2024 0.00  0.00 - 0.04 K/uL Final    Comment: Mild elevation in immature granulocytes is non specific and   can be seen in a variety of conditions including stress response,   acute inflammation, trauma and pregnancy. Correlation with other   laboratory and clinical findings is essential.      Lymph # 04/17/2024 2.0  1.0 - 4.8 K/uL Final    Mono # 04/17/2024 0.5  0.3 - 1.0 K/uL Final    Eos # 04/17/2024 0.2  0.0 - 0.5 K/uL Final    Baso # 04/17/2024 0.07  0.00 - 0.20 K/uL Final    nRBC 04/17/2024 0  0 /100 WBC Final    Gran % 04/17/2024 39.5  38.0 - 73.0 % Final    Lymph % 04/17/2024 44.4  18.0 - 48.0 % Final    Mono % 04/17/2024 10.7  4.0 - 15.0 % Final    Eosinophil % 04/17/2024 3.8  0.0 - 8.0 % Final    Basophil % 04/17/2024 1.6  0.0 - 1.9 % Final    Differential Method 04/17/2024 Automated   Final    Sodium 04/17/2024 137  136 - 145 mmol/L Final    Potassium 04/17/2024 4.0  3.5 - 5.1 mmol/L Final    Chloride 04/17/2024 104  95 - 110 mmol/L Final    CO2 04/17/2024 28  23 - 29 mmol/L Final    Glucose 04/17/2024 90  70 - 110 mg/dL Final    BUN 04/17/2024 29 (H)  8 - 23 mg/dL Final    Creatinine 04/17/2024 0.9  0.5 - 1.4 mg/dL Final    Calcium 04/17/2024 9.5  8.7 - 10.5 mg/dL Final    Total Protein 04/17/2024 6.8  6.0 - 8.4 g/dL Final    Albumin 04/17/2024 4.2  3.5 - 5.2 g/dL Final    Total Bilirubin 04/17/2024 0.7  0.1 - 1.0 mg/dL Final    Comment: For infants and newborns, interpretation of results should be based  on gestational age, weight and in agreement with clinical  observations.    Premature Infant recommended reference ranges:  Up to 24 hours.............<8.0 mg/dL  Up to 48  hours............<12.0 mg/dL  3-5 days..................<15.0 mg/dL  6-29 days.................<15.0 mg/dL      Alkaline Phosphatase 04/17/2024 65  55 - 135 U/L Final    AST 04/17/2024 18  10 - 40 U/L Final    ALT 04/17/2024 13  10 - 44 U/L Final    eGFR 04/17/2024 >60.0  >60 mL/min/1.73 m^2 Final    Anion Gap 04/17/2024 5 (L)  8 - 16 mmol/L Final    Hepatitis C Ab 04/17/2024 Non Reactive  Non Reactive Final    Comment: HCV antibody alone does not differentiate  between previously resolved infection and  active infection. Equivocal and Reactive  HCV antibody results should be followed up  with an HCV RNA test to support the  diagnosis of active HCV infection.  Performed at:  [facility]  [id number]  : To Berry MD, Phone:  [phone]      Cholesterol 04/17/2024 166  120 - 199 mg/dL Final    Comment: The National Cholesterol Education Program (NCEP) has set the  following guidelines (reference ranges) for Cholesterol:  Optimal.....................<200 mg/dL  Borderline High.............200-239 mg/dL  High........................> or = 240 mg/dL      Triglycerides 04/17/2024 91  30 - 150 mg/dL Final    Comment: The National Cholesterol Education Program (NCEP) has set the  following guidelines (reference values) for triglycerides:  Normal......................<150 mg/dL  Borderline High.............150-199 mg/dL  High........................200-499 mg/dL      HDL 04/17/2024 53  40 - 75 mg/dL Final    Comment: The National Cholesterol Education Program (NCEP) has set the  following guidelines (reference values) for HDL Cholesterol:  Low...............<40 mg/dL  Optimal...........>60 mg/dL      LDL Cholesterol 04/17/2024 94.8  63.0 - 159.0 mg/dL Final    Comment: The National Cholesterol Education Program (NCEP) has set the  following guidelines (reference values) for LDL Cholesterol:  Optimal.......................<130 mg/dL  Borderline  High...............130-159 mg/dL  High..........................160-189 mg/dL  Very High.....................>190 mg/dL      HDL/Cholesterol Ratio 04/17/2024 31.9  20.0 - 50.0 % Final    Total Cholesterol/HDL Ratio 04/17/2024 3.1  2.0 - 5.0 Final    Non-HDL Cholesterol 04/17/2024 113  mg/dL Final    Comment: Risk category and Non-HDL cholesterol goals:  Coronary heart disease (CHD)or equivalent (10-year risk of CHD >20%):  Non-HDL cholesterol goal     <130 mg/dL  Two or more CHD risk factors and 10-year risk of CHD <= 20%:  Non-HDL cholesterol goal     <160 mg/dL  0 to 1 CHD risk factor:  Non-HDL cholesterol goal     <190 mg/dL      TSH 04/17/2024 3.267  0.340 - 5.600 uIU/mL Final    Specimen UA 04/17/2024 Urine, Clean Catch   Final    Color, UA 04/17/2024 Yellow  Yellow, Straw, Dahlia Final    Appearance, UA 04/17/2024 Clear  Clear Final    pH, UA 04/17/2024 6.0  5.0 - 8.0 Final    Specific Gravity, UA 04/17/2024 1.030  1.005 - 1.030 Final    Protein, UA 04/17/2024 Negative  Negative Final    Comment: Recommend a 24 hour urine protein or a urine   protein/creatinine ratio if globulin induced proteinuria is  clinically suspected.      Glucose, UA 04/17/2024 Negative  Negative Final    Ketones, UA 04/17/2024 Negative  Negative Final    Bilirubin (UA) 04/17/2024 Negative  Negative Final    Occult Blood UA 04/17/2024 1+ (A)  Negative Final    Nitrite, UA 04/17/2024 Negative  Negative Final    Urobilinogen, UA 04/17/2024 Negative  Negative EU/dL Final    Leukocytes, UA 04/17/2024 3+ (A)  Negative Final    Hemoglobin A1C 04/17/2024 5.6  4.5 - 6.2 % Final    Comment: According to ADA guidelines, hemoglobin A1C <7.0% represents  optimal control in non-pregnant diabetic patients.  Different  metrics may apply to specific populations.   Standards of Medical Care in Diabetes - 2016.    For the purpose of screening for the presence of diabetes:  <5.7%     Consistent with the absence of diabetes  5.7-6.4%  Consistent with  increasing risk for diabetes   (prediabetes)  >or=6.5%  Consistent with diabetes    Currently no consensus exists for use of hemoglobin A1C  for diagnosis of diabetes for children.      Estimated Avg Glucose 04/17/2024 114  68 - 131 mg/dL Final    RBC, UA 04/17/2024 5 (H)  0 - 4 /hpf Final    WBC, UA 04/17/2024 47 (H)  0 - 5 /hpf Final    Squam Epithel, UA 04/17/2024 3  /hpf Final    Non-Squam Epith 04/17/2024 2 (A)  <1/hpf /hpf Final    Microscopic Comment 04/17/2024 SEE COMMENT   Final    Comment: Other formed elements not mentioned in the report are not   present in the microscopic examination.      ]    Last visit note, most recent available labs, and health maintenance reviewed    Assessment:       1. Abnormal urinalysis    2. Heartburn    3. Epigastric mass    4. Epigastric pain    5. Tinea pedis of left foot        Plan:       Abnormal urinalysis  -     CULTURE, URINE; Future; Expected date: 05/14/2024  -     Urinalysis Microscopic; Future    Heartburn  -     famotidine (PEPCID) 20 MG tablet; Take 1 tablet (20 mg total) by mouth 2 (two) times daily.  Dispense: 60 tablet; Refill: 1    Epigastric mass  -     CT Abdomen Without Contrast; Future; Expected date: 05/14/2024    Epigastric pain  -     CT Abdomen Without Contrast; Future; Expected date: 05/14/2024  F/u with gastro as previously advised    Tinea pedis of left foot  -     Ambulatory referral/consult to Podiatry; Future; Expected date: 05/21/2024        Follow up in about 6 months (around 11/14/2024) for GERD.      5/14/2024 Vasile Cazares, VERA, FNP

## 2024-05-16 ENCOUNTER — TELEPHONE (OUTPATIENT)
Dept: FAMILY MEDICINE | Facility: CLINIC | Age: 64
End: 2024-05-16
Payer: MEDICAID

## 2024-05-16 NOTE — TELEPHONE ENCOUNTER
----- Message from Vasile Cazares NP sent at 5/16/2024  3:30 PM CDT -----  Microscopic urine ok. No hematuria

## 2024-05-17 LAB
BACTERIA #/AREA URNS HPF: ABNORMAL /HPF
BACTERIA UR CULT: NORMAL
HYALINE CASTS #/AREA URNS LPF: ABNORMAL /LPF
RBC #/AREA URNS HPF: ABNORMAL /HPF
SERVICE CMNT-IMP: ABNORMAL
SQUAMOUS #/AREA URNS HPF: ABNORMAL /HPF
WBC #/AREA URNS HPF: ABNORMAL /HPF

## 2024-05-28 ENCOUNTER — HOSPITAL ENCOUNTER (OUTPATIENT)
Dept: RADIOLOGY | Facility: HOSPITAL | Age: 64
Discharge: HOME | End: 2024-05-28
Attending: NURSE PRACTITIONER
Payer: MEDICAID

## 2024-05-28 DIAGNOSIS — R19.06 EPIGASTRIC MASS: ICD-10-CM

## 2024-05-28 DIAGNOSIS — R10.13 EPIGASTRIC PAIN: ICD-10-CM

## 2024-05-28 LAB
CREAT SERPL-MCNC: 0.8 MG/DL (ref 0.5–1.4)
SAMPLE: NORMAL

## 2024-05-28 PROCEDURE — 74160 CT ABDOMEN W/CONTRAST: CPT | Mod: TC

## 2024-05-28 PROCEDURE — 25500020 PHARM REV CODE 255

## 2024-05-28 PROCEDURE — 74160 CT ABDOMEN W/CONTRAST: CPT | Mod: 26,,, | Performed by: RADIOLOGY

## 2024-05-28 RX ADMIN — IOHEXOL 75 ML: 350 INJECTION, SOLUTION INTRAVENOUS at 12:05

## 2024-05-30 ENCOUNTER — TELEPHONE (OUTPATIENT)
Dept: FAMILY MEDICINE | Facility: CLINIC | Age: 64
End: 2024-05-30
Payer: MEDICAID

## 2024-05-30 DIAGNOSIS — K43.9 EPIGASTRIC HERNIA: Primary | ICD-10-CM

## 2024-05-30 DIAGNOSIS — R93.89 ABNORMAL FINDING ON IMAGING: ICD-10-CM

## 2024-05-30 DIAGNOSIS — R93.5 ABNORMAL CT OF THE ABDOMEN: ICD-10-CM

## 2024-05-30 NOTE — TELEPHONE ENCOUNTER
----- Message from Vasile Cazares NP sent at 5/30/2024 10:17 AM CDT -----  Epigastric hernia slightly increased in size, I would to refer to general surgery for eval. Incidentally noted ongoing chronic changes in the lung bases possible from chronic infection I would like to refer to pulmonology for eval

## 2024-06-07 ENCOUNTER — OFFICE VISIT (OUTPATIENT)
Dept: PULMONOLOGY | Facility: CLINIC | Age: 64
End: 2024-06-07
Payer: MEDICAID

## 2024-06-07 VITALS
SYSTOLIC BLOOD PRESSURE: 120 MMHG | DIASTOLIC BLOOD PRESSURE: 62 MMHG | OXYGEN SATURATION: 98 % | BODY MASS INDEX: 27.46 KG/M2 | WEIGHT: 165 LBS | HEART RATE: 91 BPM

## 2024-06-07 DIAGNOSIS — R06.00 DYSPNEA, UNSPECIFIED TYPE: Primary | ICD-10-CM

## 2024-06-07 DIAGNOSIS — R93.89 ABNORMAL FINDING ON IMAGING: ICD-10-CM

## 2024-06-07 PROCEDURE — 3008F BODY MASS INDEX DOCD: CPT | Mod: CPTII,S$GLB,, | Performed by: NURSE PRACTITIONER

## 2024-06-07 PROCEDURE — 3044F HG A1C LEVEL LT 7.0%: CPT | Mod: CPTII,S$GLB,, | Performed by: NURSE PRACTITIONER

## 2024-06-07 PROCEDURE — 1159F MED LIST DOCD IN RCRD: CPT | Mod: CPTII,S$GLB,, | Performed by: NURSE PRACTITIONER

## 2024-06-07 PROCEDURE — 3078F DIAST BP <80 MM HG: CPT | Mod: CPTII,S$GLB,, | Performed by: NURSE PRACTITIONER

## 2024-06-07 PROCEDURE — 3074F SYST BP LT 130 MM HG: CPT | Mod: CPTII,S$GLB,, | Performed by: NURSE PRACTITIONER

## 2024-06-07 PROCEDURE — 99203 OFFICE O/P NEW LOW 30 MIN: CPT | Mod: S$GLB,,, | Performed by: NURSE PRACTITIONER

## 2024-06-07 NOTE — PROGRESS NOTES
SUBJECTIVE:    Patient ID: Geovanna Harvey is a 64 y.o. female.    Chief Complaint: New Patient (New Patient/Referred by Vasile Cazares for Abnormal findings on CT abdomen)    HPI  Patient here to to be evaluated for abnormality seen on Ct of abdomen of lung bases.  The patient is a never smoker, father did smoke in home when younger. She has no asthma history. She denies shortness of breath and no cough. She has had Covid 3 times, her last bout was last September and then she got Flu after that.  The abdominal CT was done to assess hernia that has been causing pain.  She is meeting with a surgeon in the near future.    Past Medical History:   Diagnosis Date    Anxiety disorder, unspecified     Insomnia      Past Surgical History:   Procedure Laterality Date    CHOLECYSTECTOMY      HYSTERECTOMY       Family History   Problem Relation Name Age of Onset    Kidney disease Mother      Diabetes Mother      Depression Father          Social History:   Marital Status:   Occupation: Data Unavailable  Alcohol History:  reports that she does not currently use alcohol.  Tobacco History:  reports that she has never smoked. She has never used smokeless tobacco.  Drug History:  reports no history of drug use.    Review of patient's allergies indicates:  No Known Allergies    Current Outpatient Medications   Medication Sig Dispense Refill    citalopram (CELEXA) 40 MG tablet       famotidine (PEPCID) 20 MG tablet Take 1 tablet (20 mg total) by mouth 2 (two) times daily. 60 tablet 1    traZODone (DESYREL) 50 MG tablet Take 50 mg by mouth every evening.      phentermine (ADIPEX-P) 37.5 mg tablet Take 37.5 mg by mouth every morning. (Patient not taking: Reported on 6/7/2024)       Current Facility-Administered Medications   Medication Dose Route Frequency Provider Last Rate Last Admin    acetaminophen tablet 650 mg  650 mg Oral Once PRN Hever Hathaway MD        albuterol inhaler 2 puff  2 puff Inhalation Q20 Min PRN  Hever Hathaway MD        diphenhydrAMINE injection 25 mg  25 mg Intravenous Once PRN Hever Hathaway MD        EPINEPHrine (EPIPEN) 0.3 mg/0.3 mL pen injection 0.3 mg  0.3 mg Intramuscular PRN Hever Hathaway MD        methylPREDNISolone sodium succinate injection 40 mg  40 mg Intravenous Once PRN Hever Hathaway MD        ondansetron disintegrating tablet 4 mg  4 mg Oral Once PRN Hever Hathaway MD        sodium chloride 0.9% 500 mL flush bag   Intravenous PRN Hever Hathaway MD   Stopped at 08/12/21 1400    sodium chloride 0.9% flush 10 mL  10 mL Intravenous PRN Hever Hathaway MD         CT abdomen 05/2024  Impression:     Epigastric hernia containing fat and a trace amount of fluid, increased only slightly in size since 2014.  This is located anterior to the liver, and the potential for bowel entering the hernia appears low.     Abnormal appearance of the lung bases unchanged since 2014.  Suspect a chronic atypical infection such as LEODAN    Review of Systems  General: Feeling Well. No night sweats, no fever  Eyes: Vision is good.  ENT:  No sinusitis or pharyngitis.   Heart:: No chest pain or palpitations.  Lungs: No cough, sputum, or wheezing.  GI: No Nausea, vomiting, constipation, diarrhea, or reflux.  : No dysuria, hesitancy, or nocturia.  Musculoskeletal: No joint pain or myalgias.  Skin: No lesions or rashes.  Neuro: No headaches or neuropathy.  Lymph: No edema or adenopathy.  Psych: No anxiety or depression.  Endo: no weight loss    OBJECTIVE:      /62 (BP Location: Right arm, Patient Position: Sitting, BP Method: Medium (Manual))   Pulse 91   Wt 74.8 kg (165 lb)   SpO2 98%   BMI 27.46 kg/m²     Physical Exam  GENERAL: Older patient in no distress.  HEENT: Pupils equal and reactive. Extraocular movements intact. Nose intact.      Pharynx moist.  NECK: Supple.   HEART: Regular rate and rhythm. No murmur or gallop auscultated.  LUNGS: Clear to auscultation and  percussion. Lung excursion symmetrical. No change in fremitus. No adventitial noises.  ABDOMEN: Bowel sounds present. Non-tender, no masses palpated.  EXTREMITIES: Normal muscle tone and joint movement, no cyanosis or clubbing.   LYMPHATICS: No adenopathy palpated, no edema.  SKIN: Dry, intact, no lesions.   NEURO: Cranial nerves II-XII intact. Motor strength 5/5 bilaterally, upper and lower extremities.  PSYCH: Appropriate affect.    Assessment:       1. Dyspnea, unspecified type    2. Abnormal finding on imaging          Plan:       Needs dedicated Ct of chest to assess  PFT  Follow up in about 3 weeks (around 6/28/2024).

## 2024-06-14 ENCOUNTER — OFFICE VISIT (OUTPATIENT)
Dept: SURGERY | Facility: CLINIC | Age: 64
End: 2024-06-14
Payer: MEDICAID

## 2024-06-14 VITALS
HEIGHT: 65 IN | BODY MASS INDEX: 27.03 KG/M2 | WEIGHT: 162.25 LBS | SYSTOLIC BLOOD PRESSURE: 133 MMHG | OXYGEN SATURATION: 100 % | HEART RATE: 71 BPM | DIASTOLIC BLOOD PRESSURE: 71 MMHG

## 2024-06-14 DIAGNOSIS — K43.9 EPIGASTRIC HERNIA: ICD-10-CM

## 2024-06-14 PROCEDURE — 99999 PR PBB SHADOW E&M-EST. PATIENT-LVL V: CPT | Mod: PBBFAC,,, | Performed by: STUDENT IN AN ORGANIZED HEALTH CARE EDUCATION/TRAINING PROGRAM

## 2024-06-14 PROCEDURE — 3075F SYST BP GE 130 - 139MM HG: CPT | Mod: CPTII,,, | Performed by: STUDENT IN AN ORGANIZED HEALTH CARE EDUCATION/TRAINING PROGRAM

## 2024-06-14 PROCEDURE — 3008F BODY MASS INDEX DOCD: CPT | Mod: CPTII,,, | Performed by: STUDENT IN AN ORGANIZED HEALTH CARE EDUCATION/TRAINING PROGRAM

## 2024-06-14 PROCEDURE — 3078F DIAST BP <80 MM HG: CPT | Mod: CPTII,,, | Performed by: STUDENT IN AN ORGANIZED HEALTH CARE EDUCATION/TRAINING PROGRAM

## 2024-06-14 PROCEDURE — 99215 OFFICE O/P EST HI 40 MIN: CPT | Mod: PBBFAC | Performed by: STUDENT IN AN ORGANIZED HEALTH CARE EDUCATION/TRAINING PROGRAM

## 2024-06-14 PROCEDURE — 1159F MED LIST DOCD IN RCRD: CPT | Mod: CPTII,,, | Performed by: STUDENT IN AN ORGANIZED HEALTH CARE EDUCATION/TRAINING PROGRAM

## 2024-06-14 PROCEDURE — 99204 OFFICE O/P NEW MOD 45 MIN: CPT | Mod: S$PBB,,, | Performed by: STUDENT IN AN ORGANIZED HEALTH CARE EDUCATION/TRAINING PROGRAM

## 2024-06-14 PROCEDURE — 3044F HG A1C LEVEL LT 7.0%: CPT | Mod: CPTII,,, | Performed by: STUDENT IN AN ORGANIZED HEALTH CARE EDUCATION/TRAINING PROGRAM

## 2024-06-14 NOTE — PROGRESS NOTES
Surgery Clinic Note - H and P    Subjective:     Geovanna Harvey is a 64 y.o. female with h/o anxiety who presents to clinic for evaluation of epigastric hernia. She first noticed a bulge in her upper abdomen when she was pregnant with her daughter 42 years ago. Over the years it has gotten larger in size and started to cause more discomfort. She reports some episodes of it bulging out and become hard to the touch and then subsequent decreasing in size on its own. Recent CT scan showed midline, fat containing ventral hernia in the epigastric region with mouth of the hernia measuring 12 x 21 mm. She also reports some issues with reflux sometimes associate with the     PSH:  x2, hysterectomy, cholecystectomy        PMH:   Past Medical History:   Diagnosis Date    Anxiety disorder, unspecified     Insomnia        Past Surgical History:   Past Surgical History:   Procedure Laterality Date    CHOLECYSTECTOMY      HYSTERECTOMY         Social History:  Social History     Socioeconomic History    Marital status:    Tobacco Use    Smoking status: Never    Smokeless tobacco: Never   Substance and Sexual Activity    Alcohol use: Not Currently    Drug use: Never   Social History Narrative    ** Merged History Encounter **            Allergies: Review of patient's allergies indicates:  No Known Allergies    Medications:  Current Outpatient Medications:     citalopram (CELEXA) 40 MG tablet, , Disp: , Rfl:     famotidine (PEPCID) 20 MG tablet, Take 1 tablet (20 mg total) by mouth 2 (two) times daily., Disp: 60 tablet, Rfl: 1    phentermine (ADIPEX-P) 37.5 mg tablet, Take 37.5 mg by mouth every morning. (Patient not taking: Reported on 2024), Disp: , Rfl:     traZODone (DESYREL) 50 MG tablet, Take 50 mg by mouth every evening., Disp: , Rfl:     Current Facility-Administered Medications:     acetaminophen tablet 650 mg, 650 mg, Oral, Once PRN, Hever Hathaway MD    albuterol inhaler 2 puff, 2 puff,  Inhalation, Q20 Min PRNMag Archie C., MD    diphenhydrAMINE injection 25 mg, 25 mg, Intravenous, Once Mag RODRIGUEZ Archie C., MD    EPINEPHrine (EPIPEN) 0.3 mg/0.3 mL pen injection 0.3 mg, 0.3 mg, Intramuscular, Mag RODRIGUEZ Archie C., MD    methylPREDNISolone sodium succinate injection 40 mg, 40 mg, Intravenous, Once PRMag APONTE Archie C., MD    ondansetron disintegrating tablet 4 mg, 4 mg, Oral, Once PRNMag Archie C., MD    sodium chloride 0.9% 500 mL flush bag, , Intravenous, PRNMag Archie C., MD, Stopped at 08/12/21 1400    sodium chloride 0.9% flush 10 mL, 10 mL, Intravenous, PRNMag Archie C., MD    Current Outpatient Medications on File Prior to Visit   Medication Sig Dispense Refill    citalopram (CELEXA) 40 MG tablet       famotidine (PEPCID) 20 MG tablet Take 1 tablet (20 mg total) by mouth 2 (two) times daily. 60 tablet 1    phentermine (ADIPEX-P) 37.5 mg tablet Take 37.5 mg by mouth every morning. (Patient not taking: Reported on 6/7/2024)      traZODone (DESYREL) 50 MG tablet Take 50 mg by mouth every evening.       Current Facility-Administered Medications on File Prior to Visit   Medication Dose Route Frequency Provider Last Rate Last Admin    acetaminophen tablet 650 mg  650 mg Oral Once PRN Hever Hathaway MD        albuterol inhaler 2 puff  2 puff Inhalation Q20 Min PRHever Umana MD        diphenhydrAMINE injection 25 mg  25 mg Intravenous Once PRN Hever Hathaway MD        EPINEPHrine (EPIPEN) 0.3 mg/0.3 mL pen injection 0.3 mg  0.3 mg Intramuscular PRN Hever Hathaway MD        methylPREDNISolone sodium succinate injection 40 mg  40 mg Intravenous Once PRN Hever Hathaway MD        ondansetron disintegrating tablet 4 mg  4 mg Oral Once PRHever Umana MD        sodium chloride 0.9% 500 mL flush bag   Intravenous PRN Hever Hathaway MD   Stopped at 08/12/21 1400    sodium chloride 0.9% flush 10 mL  10 mL Intravenous PRN Mag,  Hever BURGESS MD             Objective:     PHYSICAL EXAM:  Vital Signs (Most Recent)       ROS A 10+ review of systems was performed with pertinent positives and negatives noted above in the history of present illness.  Other systems were negative unless otherwise specified.    Physical Exam     Pathology- reviewed    Specimens (From admission, onward)      None                     Assessment:     64 y.o. female with ventral hernia in epigastric region    Plan:     - Plan for repair in the OR  - Consent obtained in clinic    Benita Gomez MD   [facility]

## 2024-06-17 NOTE — H&P
Acute Care Surgery: History & Physical    Chief Complaint:  Epigastric hernia tenderness    Subjective:  Ms. Harvey is a 64 y.o. female who presents for evaluation of a tender hernia in the epigastric region.  Patient has had the defects present for many years and she notices some intermittent bulging in the area that has always been reducible.  Recently the area has become more tender to touch and she was referred to our clinic for evaluation for possible repair.      On examination, Ms. Harvey is sitting comfortably in the room upon entering in no acute distress and in good spirits. She denies headaches, malaise, shortness of breath, chest pain, or nausea, vomiting, fever, chills, or nightsweats. She denies any abdominal pain, and the abdomen is soft, nontender, and nondistended on palpation.  The defect was able to be palpated however unable to be fully reduced secondary to discomfort with manipulation.    CT scan of the area revealed a 1.2 x 2.1 neck epigastric hernia with preperitoneal fat present within the defect -- similar in size to a previous scan back in 2014.    PMHx, SHx, FHx, SocHx, Allergies, Medications:  Ms. Harvey  has a past medical history of Anxiety disorder, unspecified and Insomnia.     She  has a past surgical history that includes Hysterectomy and Cholecystectomy.    She  reports that she has never smoked. She has never used smokeless tobacco. She reports that she does not currently use alcohol. She reports that she does not use drugs.     Ms. Harvey's family history includes Depression in her father; Diabetes in her mother; Kidney disease in her mother.  She has No Known Allergies.    Ms. Harvey has a current medication list which includes the following prescription(s): citalopram, famotidine, phentermine, and trazodone, and the following Facility-Administered Medications: acetaminophen, albuterol, diphenhydramine, epinephrine, methylprednisolone sodium succinate,  "ondansetron, sodium chloride 0.9% 500 mL flush bag, and sodium chloride 0.9%.    ROS:  Pertinent items from 14 system review are noted in HPI, all others negative     Objective:  /71 (BP Location: Left arm, Patient Position: Sitting, BP Method: Medium (Automatic))   Pulse 71   Ht 5' 5" (1.651 m)   Wt 73.6 kg (162 lb 4.1 oz)   SpO2 100%   BMI 27.00 kg/m²     Physical Exam:  Gen: no acute distress, alert and oriented  HEENT: extraocular movements intact   CV: regular rate and rhythm, bilateral radial pulses 2+    Pulm: no increased work of breathing, symmetrical chest rise  Abd: Soft, nondistended, bulging in the epigastric area consistent with known hernia.  Tenderness in the dilation  Extr: moves all extremities well  Neuro: CN II-XII grossly intact w/o focal deficit  Integument: Normal turgor and tone. No jaundice.  Psych: appropriate affect, normal speech    Medical Decision Making:  Data reviewed during evaluation of Ms. Harvey includes lab results, personal interpretation of imaging results, and other practitioner's notes and charts prior to this encounter including review of the results from >2 unique tests. Patient seen for a stable, chronic illness. Plan is for elective major surgery without identifiable risk factors.         Assessment:   Ms. Harvey is a 64 y.o. female who presents for evaluation of a tender hernia in the epigastric region.    We discussed that hernias do not go away on their own, and that only surgery can repair a hernia. Options include scheduling elective repair of the hernia, or continuing a conservative approach and not repairing the hernia if it is overall asymptomatic or mildly symptomatic. Hernias tend to get larger in size over long periods of time, but they are safe to monitor no matter the size. The risk of strangulation was explained including worrisome symptoms of increased pain in the area, overlying skin changes, or obstructive symptoms that could warrant " emergent surgery, but that the risk of that happening is low and close observation of asymptomatic hernias is a safe option. For her ventral hernia, we discussed the use of mesh contingent on the size of the defect intraoperatively.     Ms. Harvey was informed that postoperatively, we recommend lifting restriction of no more than 10lbs for 6-8weeks. She understands and agrees to the lifting restriction postoperatively to help reduce the risk of recurrence.     Due to the discomfort and symptomatic nature of the hernia, Ms. Harvey wishes to go forward with open hernia repair.     After discussing the alternatives, benefits, and risks for the proposed operation, Ms. Harvey wished to proceed. All of Ms. Harvey questions concerning the operation were answered, she expressed full understanding of the procedure and the risks/benefits associated, and signed informed consent was obtained.    Plan:  -OR for ventral hernia repair +/- mesh   -informed consent obtained     Jose Alberto Gutiérrez MD, FACS  Acute Care Surgery & Surgical Critical Care  [facility]

## 2024-06-18 ENCOUNTER — TELEPHONE (OUTPATIENT)
Dept: SURGERY | Facility: CLINIC | Age: 64
End: 2024-06-18
Payer: MEDICAID

## 2024-06-18 NOTE — TELEPHONE ENCOUNTER
Pt has decided not to have the ventral hernia procedure at this time. Will call back at later time if she does wish to proceed with surgical option. Schedulers notified to remove case.

## 2024-06-18 NOTE — TELEPHONE ENCOUNTER
----- Message from Rajiv Carr sent at 6/18/2024  1:43 PM CDT -----  Regarding: Procedure  Contact: [phone]  Who call ? Geovanna Harvey     What is the request Details : Pt calling to speak with someone in provider office regards cancelling procedure on 6/28 .       Can clinic  use patient portal  : No     What number to call back : [phone]

## 2024-06-27 ENCOUNTER — HOSPITAL ENCOUNTER (OUTPATIENT)
Dept: RADIOLOGY | Facility: HOSPITAL | Age: 64
Discharge: HOME | End: 2024-06-27
Attending: NURSE PRACTITIONER
Payer: MEDICAID

## 2024-06-27 ENCOUNTER — HOSPITAL ENCOUNTER (OUTPATIENT)
Dept: PULMONOLOGY | Facility: HOSPITAL | Age: 64
Discharge: HOME | End: 2024-06-27
Attending: NURSE PRACTITIONER
Payer: MEDICAID

## 2024-06-27 ENCOUNTER — TELEPHONE (OUTPATIENT)
Dept: PULMONOLOGY | Facility: CLINIC | Age: 64
End: 2024-06-27
Payer: MEDICAID

## 2024-06-27 DIAGNOSIS — R93.89 ABNORMAL FINDING ON IMAGING: ICD-10-CM

## 2024-06-27 DIAGNOSIS — R06.00 DYSPNEA, UNSPECIFIED TYPE: ICD-10-CM

## 2024-06-27 PROCEDURE — 94729 DIFFUSING CAPACITY: CPT

## 2024-06-27 PROCEDURE — 94727 GAS DIL/WSHOT DETER LNG VOL: CPT

## 2024-06-27 PROCEDURE — 71250 CT THORAX DX C-: CPT | Mod: 26,,, | Performed by: RADIOLOGY

## 2024-06-27 PROCEDURE — 94010 BREATHING CAPACITY TEST: CPT

## 2024-06-27 PROCEDURE — 71250 CT THORAX DX C-: CPT | Mod: TC

## 2024-06-27 NOTE — TELEPHONE ENCOUNTER
CT chest  impression:     Scattered tree-in-bud reticulonodular opacities in lung bases, right greater than left most likely secondary to chronic atypical infection such as LEODAN     Atelectasis in the lingula     No confluent infiltrates or pleural effusions

## 2024-07-01 ENCOUNTER — OFFICE VISIT (OUTPATIENT)
Dept: PULMONOLOGY | Facility: CLINIC | Age: 64
End: 2024-07-01
Payer: MEDICAID

## 2024-07-01 VITALS
SYSTOLIC BLOOD PRESSURE: 122 MMHG | BODY MASS INDEX: 26.46 KG/M2 | WEIGHT: 159 LBS | OXYGEN SATURATION: 97 % | HEART RATE: 94 BPM | DIASTOLIC BLOOD PRESSURE: 68 MMHG

## 2024-07-01 DIAGNOSIS — R93.89 ABNORMAL FINDING ON IMAGING: Primary | ICD-10-CM

## 2024-07-01 PROCEDURE — 99213 OFFICE O/P EST LOW 20 MIN: CPT | Mod: PBBFAC,PN | Performed by: NURSE PRACTITIONER

## 2024-07-01 PROCEDURE — 3074F SYST BP LT 130 MM HG: CPT | Mod: CPTII,,, | Performed by: NURSE PRACTITIONER

## 2024-07-01 PROCEDURE — 3078F DIAST BP <80 MM HG: CPT | Mod: CPTII,,, | Performed by: NURSE PRACTITIONER

## 2024-07-01 PROCEDURE — 1159F MED LIST DOCD IN RCRD: CPT | Mod: CPTII,,, | Performed by: NURSE PRACTITIONER

## 2024-07-01 PROCEDURE — 3044F HG A1C LEVEL LT 7.0%: CPT | Mod: CPTII,,, | Performed by: NURSE PRACTITIONER

## 2024-07-01 PROCEDURE — 99212 OFFICE O/P EST SF 10 MIN: CPT | Mod: S$PBB,,, | Performed by: NURSE PRACTITIONER

## 2024-07-01 PROCEDURE — 3008F BODY MASS INDEX DOCD: CPT | Mod: CPTII,,, | Performed by: NURSE PRACTITIONER

## 2024-07-01 PROCEDURE — 99999 PR PBB SHADOW E&M-EST. PATIENT-LVL III: CPT | Mod: PBBFAC,,, | Performed by: NURSE PRACTITIONER

## 2024-07-01 NOTE — PATIENT INSTRUCTIONS
Need PFT results  Call me if you develop night sweats, fever, unintentional weight loss, coughing up blood, increased shortness of breath or cough

## 2024-07-01 NOTE — PROGRESS NOTES
SUBJECTIVE:    Patient ID: Geovanna Harvey is a 64 y.o. female.    Chief Complaint: Follow-up (3 week follow up test results)    HPI    Patient here today feeling well.  She had her PFT but I am not able to see the report.  Her CT scan showed Scattered tree-in-bud reticulonodular opacities in lung bases, right greater than left that was seen on prior Ct of abdomen in 2014.  She denies cough, no night sweats, no fever, no weight loss, and no shortness of breath.   Past Medical History:   Diagnosis Date    Anxiety disorder, unspecified     Insomnia      Past Surgical History:   Procedure Laterality Date    CHOLECYSTECTOMY      HYSTERECTOMY       Family History   Problem Relation Name Age of Onset    Kidney disease Mother      Diabetes Mother      Depression Father          Social History:   Marital Status:   Occupation: Data Unavailable  Alcohol History:  reports that she does not currently use alcohol.  Tobacco History:  reports that she has never smoked. She has never used smokeless tobacco.  Drug History:  reports no history of drug use.    Review of patient's allergies indicates:  No Known Allergies    Current Outpatient Medications   Medication Sig Dispense Refill    citalopram (CELEXA) 40 MG tablet       famotidine (PEPCID) 20 MG tablet Take 1 tablet (20 mg total) by mouth 2 (two) times daily. 60 tablet 1    traZODone (DESYREL) 50 MG tablet Take 50 mg by mouth every evening.      phentermine (ADIPEX-P) 37.5 mg tablet Take 37.5 mg by mouth every morning. (Patient not taking: Reported on 6/7/2024)       Current Facility-Administered Medications   Medication Dose Route Frequency Provider Last Rate Last Admin    acetaminophen tablet 650 mg  650 mg Oral Once PRN Hever Hathaway MD        albuterol inhaler 2 puff  2 puff Inhalation Q20 Min PRN Hever Hathaway MD        diphenhydrAMINE injection 25 mg  25 mg Intravenous Once PRN Hever Hathaway MD        EPINEPHrine (EPIPEN) 0.3 mg/0.3 mL  pen injection 0.3 mg  0.3 mg Intramuscular PRN Hever Hathaway MD        methylPREDNISolone sodium succinate injection 40 mg  40 mg Intravenous Once PRN Hever Hathaway MD        ondansetron disintegrating tablet 4 mg  4 mg Oral Once PRN Hever Hathaway MD        sodium chloride 0.9% 500 mL flush bag   Intravenous PRN Hever Hathaway MD   Stopped at 08/12/21 1400    sodium chloride 0.9% flush 10 mL  10 mL Intravenous PRN Hever Hathaway MD         CT abdomen 05/2024  Impression:     Epigastric hernia containing fat and a trace amount of fluid, increased only slightly in size since 2024.  This is located anterior to the liver, and the potential for bowel entering the hernia appears low.     Abnormal appearance of the lung bases unchanged since 2014.  Suspect a chronic atypical infection such as LEODAN    Review of Systems  General: Feeling Well. No night sweats, no fever  Eyes: Vision is good.  ENT:  No sinusitis or pharyngitis.   Heart:: No chest pain or palpitations.  Lungs: No cough, sputum, or wheezing.  GI: No Nausea, vomiting, constipation, diarrhea, or reflux.  : No dysuria, hesitancy, or nocturia.  Musculoskeletal: No joint pain or myalgias.  Skin: No lesions or rashes.  Neuro: No headaches or neuropathy.  Lymph: No edema or adenopathy.  Psych: No anxiety or depression.  Endo: no weight loss    OBJECTIVE:      /68 (BP Location: Right arm, Patient Position: Sitting, BP Method: Medium (Manual))   Pulse 94   Wt 72.1 kg (159 lb)   SpO2 97%   BMI 26.46 kg/m²     Physical Exam  GENERAL: Older patient in no distress.  HEENT: Pupils equal and reactive. Extraocular movements intact. Nose intact.      Pharynx moist.  NECK: Supple.   HEART: Regular rate and rhythm. No murmur or gallop auscultated.  LUNGS: Clear to auscultation and percussion. Lung excursion symmetrical. No change in fremitus. No adventitial noises.  ABDOMEN: Bowel sounds present. Non-tender, no masses palpated.  EXTREMITIES:  Normal muscle tone and joint movement, no cyanosis or clubbing.   LYMPHATICS: No adenopathy palpated, no edema.  SKIN: Dry, intact, no lesions.   NEURO: Cranial nerves II-XII intact. Motor strength 5/5 bilaterally, upper and lower extremities.  PSYCH: Appropriate affect.    Assessment:       1. Abnormal finding on imaging            Plan:       Need PFT results  Call me if you develop night sweats, fever, unintentional weight loss, coughing up blood, increased shortness of breath or cough  Follow up in about 1 year (around 7/1/2025).

## 2024-08-08 DIAGNOSIS — R12 HEARTBURN: ICD-10-CM

## 2024-08-08 RX ORDER — FAMOTIDINE 20 MG/1
20 TABLET, FILM COATED ORAL 2 TIMES DAILY
Qty: 60 TABLET | Refills: 1 | Status: SHIPPED | OUTPATIENT
Start: 2024-08-08

## 2024-10-14 DIAGNOSIS — R12 HEARTBURN: ICD-10-CM

## 2024-10-14 RX ORDER — FAMOTIDINE 20 MG/1
20 TABLET, FILM COATED ORAL 2 TIMES DAILY
Qty: 60 TABLET | Refills: 1 | Status: SHIPPED | OUTPATIENT
Start: 2024-10-14

## 2024-10-14 NOTE — TELEPHONE ENCOUNTER
Dr. Silvio Lynn please advise. Patient's LOV was 3/24/20. Meloxicam discontinued on 11/3/20. Please see the attached refill request.last seen 5/14/2024

## 2024-10-15 ENCOUNTER — TELEPHONE (OUTPATIENT)
Dept: FAMILY MEDICINE | Facility: CLINIC | Age: 64
End: 2024-10-15
Payer: MEDICAID

## 2024-10-15 NOTE — TELEPHONE ENCOUNTER
Previous message:  Pt got poison ivy a week ago and her skin feels like it is on fire. She went to urgent care Saturday and is not any better     Taking cetirizine and hydroxyzine and calamine. Was doing yardwork  Only taking once @ night

## 2024-10-15 NOTE — TELEPHONE ENCOUNTER
----- Message from Eri sent at 10/15/2024 11:30 AM CDT -----  Pt got poison ivy a week ago and her skin feels like it is on fire. She went to urgent care Saturday and is not any better   [phone]

## 2024-11-13 NOTE — PROGRESS NOTES
SUBJECTIVE:      Patient ID: Geovanna Harvey is a 64 y.o. female.    Chief Complaint: Gastroesophageal Reflux and Hernia    HPI  History of Present Illness    CHIEF COMPLAINT:  Geovanna presents for a follow-up visit, to receive a flu vaccine, and to discuss recurring headaches.    HPI:  Geovanna reports a recent exacerbation of a hernia. She had previously consulted a surgeon, who presented surgical and non-surgical options. Initially, she declined surgery, believing it might not be necessary, but the recent exacerbation has prompted her to reconsider.    Geovanna has been having recurring migraine headaches. She recently visited the emergency room due to a severe headache and woke up with one that morning. She consulted a neurologist in 2021 for these headaches, receiving effective medication at the time.  Geovanna has tried various OTC pain relievers including Tylenol, Advil, ibuprofen, and Aleve, with limited effectiveness.Geovanna recalls being prescribed two medications by the neurologist 2 years ago but cannot recall their names.    Geovanna mentions attempting weight loss, previously taking diet pills but has since discontinued them. She is currently managing her weight by monitoring her carbohydrate intake.    Geovanna denies current pain and any family history of colon cancer or having had polyps.    MEDICATIONS:  Geovanna is on Tylenol as needed for headaches.          Past Surgical History:   Procedure Laterality Date    CHOLECYSTECTOMY      HYSTERECTOMY       Family History   Problem Relation Name Age of Onset    Kidney disease Mother      Diabetes Mother      Depression Father        Social History     Socioeconomic History    Marital status:    Tobacco Use    Smoking status: Never     Passive exposure: Never    Smokeless tobacco: Never   Substance and Sexual Activity    Alcohol use: Not Currently    Drug use: Never   Social History Narrative    ** Merged History Encounter **           Current Outpatient Medications   Medication Sig Dispense Refill    citalopram (CELEXA) 40 MG tablet       famotidine (PEPCID) 20 MG tablet TAKE one TABLET BY MOUTH TWICE DAILY 60 tablet 1    traZODone (DESYREL) 50 MG tablet Take 50 mg by mouth every evening.      rizatriptan (MAXALT-MLT) 10 MG disintegrating tablet May repeat in 2 hours if needed 10 tablet 0     Current Facility-Administered Medications   Medication Dose Route Frequency Provider Last Rate Last Admin    acetaminophen tablet 650 mg  650 mg Oral Once PRN Hever Hathaway MD        EPINEPHrine (EPIPEN) 0.3 mg/0.3 mL pen injection 0.3 mg  0.3 mg Intramuscular PRN Hever Hathaway MD         Review of patient's allergies indicates:  No Known Allergies   Past Medical History:   Diagnosis Date    Anxiety disorder, unspecified     Insomnia      Past Surgical History:   Procedure Laterality Date    CHOLECYSTECTOMY      HYSTERECTOMY         Review of Systems   Constitutional:  Negative for appetite change, chills, diaphoresis and unexpected weight change.   HENT:  Negative for ear discharge, hearing loss, trouble swallowing and voice change.    Eyes:  Negative for photophobia and pain.   Respiratory:  Negative for chest tightness, shortness of breath and stridor.    Cardiovascular:  Negative for chest pain and palpitations.   Gastrointestinal:  Negative for abdominal pain, blood in stool and vomiting.   Endocrine: Negative for cold intolerance and heat intolerance.   Genitourinary:  Negative for difficulty urinating and flank pain.   Musculoskeletal:  Negative for joint swelling and neck stiffness.   Skin:  Negative for pallor.   Neurological:  Negative for dizziness, speech difficulty, weakness, light-headedness and headaches.   Hematological:  Does not bruise/bleed easily.   Psychiatric/Behavioral:  Negative for confusion.       OBJECTIVE:      Vitals:    11/14/24 1009   BP: 110/64   Pulse: 82   SpO2: 98%   Weight: 68.5 kg (151 lb)   Height:  "5' 5" (1.651 m)     Physical Exam  Vitals and nursing note reviewed.   Constitutional:       General: She is not in acute distress.     Appearance: She is well-developed.   HENT:      Head: Normocephalic and atraumatic.      Right Ear: Tympanic membrane normal.      Left Ear: Tympanic membrane normal.      Nose: Nose normal.      Mouth/Throat:      Pharynx: Uvula midline.   Eyes:      General: Lids are normal.      Conjunctiva/sclera: Conjunctivae normal.      Pupils: Pupils are equal, round, and reactive to light.      Right eye: Pupil is round and reactive.      Left eye: Pupil is round and reactive.   Neck:      Thyroid: No thyromegaly.      Vascular: No JVD.      Trachea: Trachea normal.   Cardiovascular:      Rate and Rhythm: Normal rate and regular rhythm.      Pulses: Normal pulses.      Heart sounds: Normal heart sounds. No murmur heard.  Pulmonary:      Effort: Pulmonary effort is normal. No tachypnea or respiratory distress.      Breath sounds: Normal breath sounds. No wheezing, rhonchi or rales.   Abdominal:      General: Bowel sounds are normal.      Palpations: Abdomen is soft.      Tenderness: There is no abdominal tenderness.   Musculoskeletal:         General: Normal range of motion.      Cervical back: Normal range of motion and neck supple.      Right lower leg: No edema.      Left lower leg: No edema.   Lymphadenopathy:      Cervical: No cervical adenopathy.   Skin:     General: Skin is warm and dry.      Findings: No rash.   Neurological:      Mental Status: She is alert and oriented to person, place, and time. Mental status is at baseline.      Sensory: Sensation is intact.      Motor: Motor function is intact.      Coordination: Coordination is intact.      Gait: Gait is intact.   Psychiatric:         Mood and Affect: Mood normal.         Speech: Speech normal.         Behavior: Behavior normal. Behavior is cooperative.         Thought Content: Thought content normal.         Judgment: Judgment " normal.       No visits with results within 1 Month(s) from this visit.   Latest known visit with results is:   Admission on 08/31/2024, Discharged on 09/01/2024   Component Date Value Ref Range Status    POC Rapid COVID 08/31/2024 Negative  Negative Final     Acceptable 08/31/2024 Yes   Final    Molecular Strep A, POC 08/31/2024 Negative  Negative Final     Acceptable 08/31/2024 Yes   Final    POC Sodium 08/31/2024 145  128 - 145 mmol/L Final    POC Potassium 08/31/2024 4.2  3.6 - 5.1 mmol/L Final    POC Chloride 08/31/2024 104  98 - 108 mmol/L Final    POC CO2 08/31/2024 33  18 - 33 mmol/L Final    POC Glucose 08/31/2024 108  73 - 118 mg/dL Final    POC BUN 08/31/2024 19  7 - 22 mg/dL Final    POC Creatinine 08/31/2024 1.0  0.6 - 1.2 mg/dL Final    Calcium, POC 08/31/2024 10.1  8.0 - 10.3 mg/dL Final    Albumin, POC 08/31/2024 4.0  3.3 - 5.5 g/dL Final    POC ALT 08/31/2024 20  10 - 47 U/L Final    POC AST 08/31/2024 29  11 - 38 U/L Final    Alkaline Phosphatase, POC 08/31/2024 74  42 - 141 U/L Final    POC TOTAL PROTEIN 08/31/2024 7.1  6.4 - 8.1 g/dL Final    POC TOTAL BILIRUBIN 08/31/2024 1.2  0.2 - 1.6 mg/dL Final    POC eGFR 08/31/2024 59 (L)  61 - 2,000 mL/min Final    POC Hemolysis 08/31/2024 0   Final    Specimen Type 08/31/2024 BLNK   Final    POC WBC 08/31/2024 5.6  3.9 - 12.7 K/uL Final    POC GRA% 08/31/2024 57.4  38.0 - 73.0 % Final    POC MID% 08/31/2024 9.0  4.0 - 15.0 % Final    POC LYM% 08/31/2024 33.6  18.0 - 48.0 % Final    POC Gran# 08/31/2024 3.2  1.8 - 7.7 K/uL Final    POC MID# 08/31/2024 0.6  0.3 - 1.0 K/uL Final    POC LYM# 08/31/2024 1.8  1.0 - 4.8 K/uL Final    POC RBC 08/31/2024 3.93 (L)  4.00 - 5.40 M/uL Final    POC HGB 08/31/2024 13.0  12.0 - 16.0 g/dL Final    POC MCV 08/31/2024 97.1  82.0 - 98.0 fl Final    POC HCT 08/31/2024 38.2  37.0 - 48.5 % Final    POC MCH 08/31/2024 33.1 (H)  27.0 - 31.0 pg Final    POC MCHC 08/31/2024 34.1  32.0 - 36.0 g/dL  Final    POC RDW% 08/31/2024 12.0  11.5 - 14.5 % Final    POC PLT 08/31/2024 272  150 - 450 K/uL Final    POC MPV 08/31/2024 8.3 (L)  9.2 - 12.9 fl Final    POC Troponin I 08/31/2024 <0.01  0.00 - 0.08 ng/mL Final      Assessment:       1. Other migraine without status migrainosus, not intractable    2. Flu vaccine need    3. Screen for colon cancer        Plan:       Other migraine without status migrainosus, not intractable  -     rizatriptan (MAXALT-MLT) 10 MG disintegrating tablet; May repeat in 2 hours if needed  Dispense: 10 tablet; Refill: 0    Flu vaccine need  -     influenza (Flulaval, Fluzone, Fluarix) 45 mcg/0.5 mL IM vaccine (> or = 6 mo) 0.5 mL    Screen for colon cancer  -     Cologuard Screening (Multitarget Stool DNA); Future; Expected date: 11/14/2024      Assessment & Plan    MIGRAINE MANAGEMENT:  Considered maxalt for migraine management, given patient's history and recent ER visit for headache.  Discussed importance of taking maxalt at the onset of migraine headaches.  Started maxalt 10 mg, take 1 tablet at onset of migraine headache.    HIATAL HERNIA:  Reviewed patient's history of hiatal hernia and previous surgical consultation.  Follow up with surgeon regarding hiatal hernia if patient decides to pursue treatment.    COLORECTAL CANCER SCREENING:  Assessed need for colorectal cancer screening; opted for Cologuard test due to lack of family history and no previous polyps.  Ordered Cologuard test for colorectal cancer screening.    PREVENTIVE CARE:  Administered flu vaccine in office.    WEIGHT MANAGEMENT:  Continue watching carbohydrate intake for weight management.    FOLLOW-UP:  Contact office for assistance with Medicare plan selection as patient transitions in May.         Follow up in about 6 months (around 5/14/2025) for wellness.  This note was generated with the assistance of ambient listening technology. Verbal consent was obtained by the patient and accompanying visitor(s) for the  recording of patient appointment to facilitate this note. I attest to having reviewed and edited the generated note for accuracy, though some syntax or spelling errors may persist. Please contact the author of this note for any clarification.        11/14/2024 VERA Sequeira, LIVP

## 2024-11-14 ENCOUNTER — OFFICE VISIT (OUTPATIENT)
Dept: FAMILY MEDICINE | Facility: CLINIC | Age: 64
End: 2024-11-14
Payer: MEDICAID

## 2024-11-14 VITALS
HEART RATE: 82 BPM | OXYGEN SATURATION: 98 % | HEIGHT: 65 IN | BODY MASS INDEX: 25.16 KG/M2 | SYSTOLIC BLOOD PRESSURE: 110 MMHG | DIASTOLIC BLOOD PRESSURE: 64 MMHG | WEIGHT: 151 LBS

## 2024-11-14 DIAGNOSIS — G43.809 OTHER MIGRAINE WITHOUT STATUS MIGRAINOSUS, NOT INTRACTABLE: Primary | ICD-10-CM

## 2024-11-14 DIAGNOSIS — Z12.11 SCREEN FOR COLON CANCER: ICD-10-CM

## 2024-11-14 DIAGNOSIS — Z23 FLU VACCINE NEED: ICD-10-CM

## 2024-11-14 RX ORDER — CETIRIZINE HYDROCHLORIDE 10 MG/1
TABLET ORAL
COMMUNITY
Start: 2024-10-18 | End: 2024-11-14

## 2024-11-14 RX ORDER — TRIAMCINOLONE ACETONIDE 1 MG/G
CREAM TOPICAL
COMMUNITY
Start: 2024-10-18 | End: 2024-11-14

## 2024-11-14 RX ORDER — HYDROXYZINE HYDROCHLORIDE 25 MG/1
TABLET, FILM COATED ORAL
COMMUNITY
Start: 2024-10-18 | End: 2024-11-14

## 2024-11-14 RX ORDER — RIZATRIPTAN BENZOATE 10 MG/1
TABLET, ORALLY DISINTEGRATING ORAL
Qty: 10 TABLET | Refills: 0 | Status: SHIPPED | OUTPATIENT
Start: 2024-11-14

## 2024-12-05 ENCOUNTER — OFFICE VISIT (OUTPATIENT)
Dept: SURGERY | Facility: CLINIC | Age: 64
End: 2024-12-05
Payer: MEDICAID

## 2024-12-05 VITALS
HEART RATE: 79 BPM | WEIGHT: 156.5 LBS | BODY MASS INDEX: 26.08 KG/M2 | HEIGHT: 65 IN | OXYGEN SATURATION: 99 % | SYSTOLIC BLOOD PRESSURE: 128 MMHG | DIASTOLIC BLOOD PRESSURE: 64 MMHG

## 2024-12-05 DIAGNOSIS — K43.9 EPIGASTRIC HERNIA: Primary | ICD-10-CM

## 2024-12-05 PROCEDURE — 3044F HG A1C LEVEL LT 7.0%: CPT | Mod: CPTII,,, | Performed by: STUDENT IN AN ORGANIZED HEALTH CARE EDUCATION/TRAINING PROGRAM

## 2024-12-05 PROCEDURE — 3078F DIAST BP <80 MM HG: CPT | Mod: CPTII,,, | Performed by: STUDENT IN AN ORGANIZED HEALTH CARE EDUCATION/TRAINING PROGRAM

## 2024-12-05 PROCEDURE — 99213 OFFICE O/P EST LOW 20 MIN: CPT | Mod: PBBFAC | Performed by: STUDENT IN AN ORGANIZED HEALTH CARE EDUCATION/TRAINING PROGRAM

## 2024-12-05 PROCEDURE — 99999 PR PBB SHADOW E&M-EST. PATIENT-LVL III: CPT | Mod: PBBFAC,,, | Performed by: STUDENT IN AN ORGANIZED HEALTH CARE EDUCATION/TRAINING PROGRAM

## 2024-12-05 PROCEDURE — 1159F MED LIST DOCD IN RCRD: CPT | Mod: CPTII,,, | Performed by: STUDENT IN AN ORGANIZED HEALTH CARE EDUCATION/TRAINING PROGRAM

## 2024-12-05 PROCEDURE — 99214 OFFICE O/P EST MOD 30 MIN: CPT | Mod: S$PBB,,, | Performed by: STUDENT IN AN ORGANIZED HEALTH CARE EDUCATION/TRAINING PROGRAM

## 2024-12-05 PROCEDURE — 3074F SYST BP LT 130 MM HG: CPT | Mod: CPTII,,, | Performed by: STUDENT IN AN ORGANIZED HEALTH CARE EDUCATION/TRAINING PROGRAM

## 2024-12-05 PROCEDURE — 3008F BODY MASS INDEX DOCD: CPT | Mod: CPTII,,, | Performed by: STUDENT IN AN ORGANIZED HEALTH CARE EDUCATION/TRAINING PROGRAM

## 2024-12-05 NOTE — PROGRESS NOTES
General Surgery Office Visit   History and Physical    Patient Name: Geovanna Harvey  YOB: 1960 (64 y.o.)  MRN: [id number]  Today's Date: 12/05/2024    Referring Md:   No referring provider defined for this encounter.    SUBJECTIVE:     Chief Complaint: Epigastric pain    History of Present Illness:  Geovanna Harvey is a 64 y.o. female with h/o anxiety who presents to clinic for evaluation of epigastric hernia. She first noticed a bulge in her upper abdomen when she was pregnant with her daughter 42 years ago. Over the years it has gotten larger in size and started to cause more discomfort. She reports some episodes of it bulging out and become hard to the touch and then subsequent decreasing in size on its own. Recent CT scan showed midline, fat containing ventral hernia in the epigastric region with mouth of the hernia measuring 12 x 21 mm.     Interval History 12/5/24:  Geovanna Harvey is a 64 y.o. female with PMHx of HTN, anxiety who presents to the clinic today for follow up of her epigastric hernia. She reports that her pain has worsened since her last clinic visit. She was scheduled for hernia repair but cancelled because she was worried about insurance and cost. Continues to have episodes of bulging and swelling to the area. Describes pain as burning. No known aggravating or alleviating factors. Associated nausea, no emesis. Having regular bowel function. She denies fever, chills, emesis, diarrhea, constipation, hematochezia, dysuria, hematuria, CP, SOB, and all other symptoms. Patient reports being compliant with home medication regimen.     Previous abdominal surgeries include: cholecystectomy, hysterectomy, C section x2  Not currently on any anticoagulants    Review of patient's allergies indicates:  No Known Allergies    Past Medical History:   Diagnosis Date    Anxiety disorder, unspecified     Insomnia      Past Surgical History:   Procedure Laterality Date     "CHOLECYSTECTOMY      HYSTERECTOMY       Family History   Problem Relation Name Age of Onset    Kidney disease Mother      Diabetes Mother      Depression Father       Social History     Tobacco Use    Smoking status: Never     Passive exposure: Never    Smokeless tobacco: Never   Substance Use Topics    Alcohol use: Not Currently    Drug use: Never        Review of Systems:  Review of Systems   Constitutional:  Negative for chills and fever.   Respiratory:  Negative for cough and shortness of breath.    Cardiovascular:  Negative for chest pain.   Gastrointestinal:  Positive for abdominal pain and nausea. Negative for diarrhea and vomiting.   Genitourinary:  Negative for dysuria and hematuria.   Musculoskeletal:  Negative for falls.   Skin:  Negative for rash.   Neurological:  Negative for dizziness and headaches.   Psychiatric/Behavioral:  Negative for substance abuse. The patient is not nervous/anxious.    All other systems reviewed and are negative.      OBJECTIVE:     Vital Signs (Most Recent)  /64 (BP Location: Left arm, Patient Position: Sitting)   Pulse 79   Ht 5' 5" (1.651 m)   Wt 71 kg (156 lb 8.4 oz)   SpO2 99%   BMI 26.05 kg/m²     Physical Exam  Vitals and nursing note reviewed.   Constitutional:       General: She is not in acute distress.     Appearance: She is not diaphoretic.      Comments: Room air   HENT:      Head: Normocephalic and atraumatic.      Mouth/Throat:      Mouth: Mucous membranes are moist.      Pharynx: Oropharynx is clear.   Eyes:      Extraocular Movements: Extraocular movements intact.      Conjunctiva/sclera: Conjunctivae normal.   Cardiovascular:      Rate and Rhythm: Normal rate.   Pulmonary:      Effort: Pulmonary effort is normal. No respiratory distress.   Abdominal:      General: There is no distension.      Palpations: Abdomen is soft.      Tenderness: There is no guarding or rebound.      Hernia: A hernia is present.      Comments: Soft, reducible ventral hernia " in epigastric region that is TTP (although distractible). No erythema or overlying skin changes.    Musculoskeletal:         General: No deformity.      Cervical back: Normal range of motion.   Skin:     General: Skin is warm and dry.   Neurological:      Mental Status: She is alert and oriented to person, place, and time.       Labs:   Lab Results   Component Value Date    WBC 4.48 04/17/2024    HGB 13.4 04/17/2024    HCT 40.8 04/17/2024     (H) 04/17/2024     04/17/2024       CMP  Sodium   Date Value Ref Range Status   04/17/2024 137 136 - 145 mmol/L Final     Potassium   Date Value Ref Range Status   04/17/2024 4.0 3.5 - 5.1 mmol/L Final     Chloride   Date Value Ref Range Status   04/17/2024 104 95 - 110 mmol/L Final     CO2   Date Value Ref Range Status   04/17/2024 28 23 - 29 mmol/L Final     Glucose   Date Value Ref Range Status   04/17/2024 90 70 - 110 mg/dL Final     BUN   Date Value Ref Range Status   04/17/2024 29 (H) 8 - 23 mg/dL Final     Creatinine   Date Value Ref Range Status   04/17/2024 0.9 0.5 - 1.4 mg/dL Final     Calcium   Date Value Ref Range Status   04/17/2024 9.5 8.7 - 10.5 mg/dL Final     Total Protein   Date Value Ref Range Status   04/17/2024 6.8 6.0 - 8.4 g/dL Final     Albumin   Date Value Ref Range Status   04/17/2024 4.2 3.5 - 5.2 g/dL Final     Total Bilirubin   Date Value Ref Range Status   04/17/2024 0.7 0.1 - 1.0 mg/dL Final     Comment:     For infants and newborns, interpretation of results should be based  on gestational age, weight and in agreement with clinical  observations.    Premature Infant recommended reference ranges:  Up to 24 hours.............<8.0 mg/dL  Up to 48 hours............<12.0 mg/dL  3-5 days..................<15.0 mg/dL  6-29 days.................<15.0 mg/dL       Alkaline Phosphatase   Date Value Ref Range Status   04/17/2024 65 55 - 135 U/L Final     AST   Date Value Ref Range Status   04/17/2024 18 10 - 40 U/L Final     ALT   Date Value  Ref Range Status   04/17/2024 13 10 - 44 U/L Final     Anion Gap   Date Value Ref Range Status   04/17/2024 5 (L) 8 - 16 mmol/L Final     eGFR   Date Value Ref Range Status   04/17/2024 >60.0 >60 mL/min/1.73 m^2 Final     Lab Results   Component Value Date    HGBA1C 5.6 04/17/2024       Imaging: CT Abdomen With IV Contrast NO Oral Contrast  Order: [id number]  Status: Final result       Visible to patient: Yes (not seen)       Next appt: 05/15/2025 at 10:20 AM in Family Medicine (Vasile Cazares NP)       Dx: Epigastric pain; Epigastric mass    2 Result Notes       1 Follow-up Encounter  Details    Reading Physician Reading Date Result Priority   Gavi Pacheco MD  [phone] 5/29/2024 Routine     Narrative & Impression  EXAMINATION:  CT ABDOMEN WITH IV CONTRAST     CLINICAL HISTORY:  Epigastric pain;  Epigastric swelling, mass or lump     TECHNIQUE:  CT of the abdomen was performed following IV administration of 75 cc of Omnipaque 350 nonionic contrast material.  No oral contrast was administered     COMPARISON:  Ultrasound 04/17/2024, previous CT 08/12/2014     FINDINGS:  There are numerous tiny clustered nodules with a tree in bud configuration throughout the lung bases, more numerous on the right compared to the left.  Similar findings are noted on the 2014 CT of the abdomen and pelvis.  Suspect chronic atypical mycobacterial infection.     The gallbladder is surgically absent.  The liver, spleen, adrenal glands, pancreas and kidneys are normal.  The aorta is normal in caliber with mild calcific plaque formation.     No abnormalities are seen in the visualized portions of the bowel.     There is a midline ventral hernia in the epigastric region containing fat as well as a trace amount of fluid.  There is mild stranding in the herniated fat.  The amount of herniated fat has increased slightly compared to the 2014 CT but otherwise, the appearance is similar.  The mouth of the hernia is fairly small measuring 12 x  "21 mm.  No other abnormalities are seen in the epigastric region.     Impression:     Epigastric hernia containing fat and a trace amount of fluid, increased only slightly in size since 2014.  This is located anterior to the liver, and the potential for bowel entering the hernia appears low.     Abnormal appearance of the lung bases unchanged since 2014.  Suspect a chronic atypical infection such as LEODAN.         ASSESSMENT/PLAN:     Geovanna Harvey is a 64 y.o. female with PMHx of HTN, anxiety who presents to the clinic today for follow up of her epigastric hernia. Clinically stable without signs of incarceration/strangulation/obstruction, interested in repair     Geovanna Llamas" was seen today for follow-up.    Diagnoses and all orders for this visit:    Epigastric hernia        - Discussed risks and benefits of both operative and nonoperative management with patient in detail. We discussed that hernias do not go away on their own, and that only surgery can repair a hernia. Options include scheduling elective repair of the hernia, or continuing a conservative approach and not repairing the hernia if it is overall asymptomatic or mildly symptomatic. Hernias tend to get larger in size over long periods of time, but they are safe to monitor no matter the size. The risk of strangulation was explained including worrisome symptoms of increased pain in the area, overlying skin changes, or obstructive symptoms that could warrant emergent surgery, but that the risk of that happening is low and close observation of asymptomatic hernias is a safe option. We discussed the use of mesh contingent on the size of the defect intraoperatively. Patient was informed that postoperatively, we recommend lifting restriction of no more than 10lbs for 6-8weeks. Patient understands and agrees to the lifting restriction postoperatively to help reduce the risk of recurrence. Patient elected for operative management  - Schedule for " ventral hernia repair with Dr. Gutiérrez  - ED precautions given  - Continue any current medications  - Call with any questions or concerns  - Discussed POC with patient. All questions were answered. Patient is agreeable to plan and verbalized understanding.     Case discussed with Dr. Gutiérrez. Patient seen and examined by Dr. Gutiérrez.    CALE Dyer, PA-C  General Surgery  - [facility]

## 2024-12-05 NOTE — H&P (VIEW-ONLY)
General Surgery Office Visit   History and Physical    Patient Name: Geovanna Harvey  YOB: 1960 (64 y.o.)  MRN: [id number]  Today's Date: 12/05/2024    Referring Md:   No referring provider defined for this encounter.    SUBJECTIVE:     Chief Complaint: Epigastric pain    History of Present Illness:  Geovanna Harvey is a 64 y.o. female with h/o anxiety who presents to clinic for evaluation of epigastric hernia. She first noticed a bulge in her upper abdomen when she was pregnant with her daughter 42 years ago. Over the years it has gotten larger in size and started to cause more discomfort. She reports some episodes of it bulging out and become hard to the touch and then subsequent decreasing in size on its own. Recent CT scan showed midline, fat containing ventral hernia in the epigastric region with mouth of the hernia measuring 12 x 21 mm.     Interval History 12/5/24:  Geovanna Harvey is a 64 y.o. female with PMHx of HTN, anxiety who presents to the clinic today for follow up of her epigastric hernia. She reports that her pain has worsened since her last clinic visit. She was scheduled for hernia repair but cancelled because she was worried about insurance and cost. Continues to have episodes of bulging and swelling to the area. Describes pain as burning. No known aggravating or alleviating factors. Associated nausea, no emesis. Having regular bowel function. She denies fever, chills, emesis, diarrhea, constipation, hematochezia, dysuria, hematuria, CP, SOB, and all other symptoms. Patient reports being compliant with home medication regimen.     Previous abdominal surgeries include: cholecystectomy, hysterectomy, C section x2  Not currently on any anticoagulants    Review of patient's allergies indicates:  No Known Allergies    Past Medical History:   Diagnosis Date    Anxiety disorder, unspecified     Insomnia      Past Surgical History:   Procedure Laterality Date     "CHOLECYSTECTOMY      HYSTERECTOMY       Family History   Problem Relation Name Age of Onset    Kidney disease Mother      Diabetes Mother      Depression Father       Social History     Tobacco Use    Smoking status: Never     Passive exposure: Never    Smokeless tobacco: Never   Substance Use Topics    Alcohol use: Not Currently    Drug use: Never        Review of Systems:  Review of Systems   Constitutional:  Negative for chills and fever.   Respiratory:  Negative for cough and shortness of breath.    Cardiovascular:  Negative for chest pain.   Gastrointestinal:  Positive for abdominal pain and nausea. Negative for diarrhea and vomiting.   Genitourinary:  Negative for dysuria and hematuria.   Musculoskeletal:  Negative for falls.   Skin:  Negative for rash.   Neurological:  Negative for dizziness and headaches.   Psychiatric/Behavioral:  Negative for substance abuse. The patient is not nervous/anxious.    All other systems reviewed and are negative.      OBJECTIVE:     Vital Signs (Most Recent)  /64 (BP Location: Left arm, Patient Position: Sitting)   Pulse 79   Ht 5' 5" (1.651 m)   Wt 71 kg (156 lb 8.4 oz)   SpO2 99%   BMI 26.05 kg/m²     Physical Exam  Vitals and nursing note reviewed.   Constitutional:       General: She is not in acute distress.     Appearance: She is not diaphoretic.      Comments: Room air   HENT:      Head: Normocephalic and atraumatic.      Mouth/Throat:      Mouth: Mucous membranes are moist.      Pharynx: Oropharynx is clear.   Eyes:      Extraocular Movements: Extraocular movements intact.      Conjunctiva/sclera: Conjunctivae normal.   Cardiovascular:      Rate and Rhythm: Normal rate.   Pulmonary:      Effort: Pulmonary effort is normal. No respiratory distress.   Abdominal:      General: There is no distension.      Palpations: Abdomen is soft.      Tenderness: There is no guarding or rebound.      Hernia: A hernia is present.      Comments: Soft, reducible ventral hernia " in epigastric region that is TTP (although distractible). No erythema or overlying skin changes.    Musculoskeletal:         General: No deformity.      Cervical back: Normal range of motion.   Skin:     General: Skin is warm and dry.   Neurological:      Mental Status: She is alert and oriented to person, place, and time.       Labs:   Lab Results   Component Value Date    WBC 4.48 04/17/2024    HGB 13.4 04/17/2024    HCT 40.8 04/17/2024     (H) 04/17/2024     04/17/2024       CMP  Sodium   Date Value Ref Range Status   04/17/2024 137 136 - 145 mmol/L Final     Potassium   Date Value Ref Range Status   04/17/2024 4.0 3.5 - 5.1 mmol/L Final     Chloride   Date Value Ref Range Status   04/17/2024 104 95 - 110 mmol/L Final     CO2   Date Value Ref Range Status   04/17/2024 28 23 - 29 mmol/L Final     Glucose   Date Value Ref Range Status   04/17/2024 90 70 - 110 mg/dL Final     BUN   Date Value Ref Range Status   04/17/2024 29 (H) 8 - 23 mg/dL Final     Creatinine   Date Value Ref Range Status   04/17/2024 0.9 0.5 - 1.4 mg/dL Final     Calcium   Date Value Ref Range Status   04/17/2024 9.5 8.7 - 10.5 mg/dL Final     Total Protein   Date Value Ref Range Status   04/17/2024 6.8 6.0 - 8.4 g/dL Final     Albumin   Date Value Ref Range Status   04/17/2024 4.2 3.5 - 5.2 g/dL Final     Total Bilirubin   Date Value Ref Range Status   04/17/2024 0.7 0.1 - 1.0 mg/dL Final     Comment:     For infants and newborns, interpretation of results should be based  on gestational age, weight and in agreement with clinical  observations.    Premature Infant recommended reference ranges:  Up to 24 hours.............<8.0 mg/dL  Up to 48 hours............<12.0 mg/dL  3-5 days..................<15.0 mg/dL  6-29 days.................<15.0 mg/dL       Alkaline Phosphatase   Date Value Ref Range Status   04/17/2024 65 55 - 135 U/L Final     AST   Date Value Ref Range Status   04/17/2024 18 10 - 40 U/L Final     ALT   Date Value  Ref Range Status   04/17/2024 13 10 - 44 U/L Final     Anion Gap   Date Value Ref Range Status   04/17/2024 5 (L) 8 - 16 mmol/L Final     eGFR   Date Value Ref Range Status   04/17/2024 >60.0 >60 mL/min/1.73 m^2 Final     Lab Results   Component Value Date    HGBA1C 5.6 04/17/2024       Imaging: CT Abdomen With IV Contrast NO Oral Contrast  Order: [id number]  Status: Final result       Visible to patient: Yes (not seen)       Next appt: 05/15/2025 at 10:20 AM in Family Medicine (Vasile Cazares NP)       Dx: Epigastric pain; Epigastric mass    2 Result Notes       1 Follow-up Encounter  Details    Reading Physician Reading Date Result Priority   Gavi Pacheco MD  [phone] 5/29/2024 Routine     Narrative & Impression  EXAMINATION:  CT ABDOMEN WITH IV CONTRAST     CLINICAL HISTORY:  Epigastric pain;  Epigastric swelling, mass or lump     TECHNIQUE:  CT of the abdomen was performed following IV administration of 75 cc of Omnipaque 350 nonionic contrast material.  No oral contrast was administered     COMPARISON:  Ultrasound 04/17/2024, previous CT 08/12/2014     FINDINGS:  There are numerous tiny clustered nodules with a tree in bud configuration throughout the lung bases, more numerous on the right compared to the left.  Similar findings are noted on the 2014 CT of the abdomen and pelvis.  Suspect chronic atypical mycobacterial infection.     The gallbladder is surgically absent.  The liver, spleen, adrenal glands, pancreas and kidneys are normal.  The aorta is normal in caliber with mild calcific plaque formation.     No abnormalities are seen in the visualized portions of the bowel.     There is a midline ventral hernia in the epigastric region containing fat as well as a trace amount of fluid.  There is mild stranding in the herniated fat.  The amount of herniated fat has increased slightly compared to the 2014 CT but otherwise, the appearance is similar.  The mouth of the hernia is fairly small measuring 12 x  "21 mm.  No other abnormalities are seen in the epigastric region.     Impression:     Epigastric hernia containing fat and a trace amount of fluid, increased only slightly in size since 2014.  This is located anterior to the liver, and the potential for bowel entering the hernia appears low.     Abnormal appearance of the lung bases unchanged since 2014.  Suspect a chronic atypical infection such as LEODAN.         ASSESSMENT/PLAN:     Geovanna Harvey is a 64 y.o. female with PMHx of HTN, anxiety who presents to the clinic today for follow up of her epigastric hernia. Clinically stable without signs of incarceration/strangulation/obstruction, interested in repair     Geovanna Llamas" was seen today for follow-up.    Diagnoses and all orders for this visit:    Epigastric hernia        - Discussed risks and benefits of both operative and nonoperative management with patient in detail. We discussed that hernias do not go away on their own, and that only surgery can repair a hernia. Options include scheduling elective repair of the hernia, or continuing a conservative approach and not repairing the hernia if it is overall asymptomatic or mildly symptomatic. Hernias tend to get larger in size over long periods of time, but they are safe to monitor no matter the size. The risk of strangulation was explained including worrisome symptoms of increased pain in the area, overlying skin changes, or obstructive symptoms that could warrant emergent surgery, but that the risk of that happening is low and close observation of asymptomatic hernias is a safe option. We discussed the use of mesh contingent on the size of the defect intraoperatively. Patient was informed that postoperatively, we recommend lifting restriction of no more than 10lbs for 6-8weeks. Patient understands and agrees to the lifting restriction postoperatively to help reduce the risk of recurrence. Patient elected for operative management  - Schedule for " ventral hernia repair with Dr. Gutiérrez  - ED precautions given  - Continue any current medications  - Call with any questions or concerns  - Discussed POC with patient. All questions were answered. Patient is agreeable to plan and verbalized understanding.     Case discussed with Dr. Gutiérrez. Patient seen and examined by Dr. Gutiérrez.    CALE Dyer, PA-C  General Surgery  - [facility]

## 2024-12-13 NOTE — PRE-PROCEDURE INSTRUCTIONS
PreOp Instructions given:   - Verbal medication information (what to hold and what to take)   - NPO guidelines   Patients should stop full meals at midnight, but they can consume clear liquids up to 2 hours prior to scheduled Surgery time.  Clear liquids include Gatorade, water, Clear liquids do NOT include anything with pulp or food particles (such as chicken broth, ice cream, yogurt, Jello, etc.)   - Arrival place directions given; time to be given the day before procedure by the   Surgeon's Office   - Bathing with antibacterial soap   - Don't wear any jewelry or bring any valuables AM of surgery   - No makeup or moisturizer to face   - No perfume/cologne, powder, lotions or aftershave   Pt. verbalized understanding.   Pt denies any h/o Anesthesia/Sedation complications or side effects.  Patient does not know arrival time.  Explained that this information comes from the surgeon's office and if they haven't heard from them by 2 or 3 pm to call the office.  Patient stated an understanding.

## 2024-12-16 ENCOUNTER — ANESTHESIA EVENT (OUTPATIENT)
Dept: SURGERY | Facility: HOSPITAL | Age: 64
End: 2024-12-16
Payer: MEDICAID

## 2024-12-16 ENCOUNTER — TELEPHONE (OUTPATIENT)
Dept: SURGERY | Facility: CLINIC | Age: 64
End: 2024-12-16
Payer: MEDICAID

## 2024-12-16 ENCOUNTER — PATIENT MESSAGE (OUTPATIENT)
Dept: SURGERY | Facility: CLINIC | Age: 64
End: 2024-12-16
Payer: MEDICAID

## 2024-12-16 NOTE — TELEPHONE ENCOUNTER
----- Message from Arvind sent at 12/16/2024  2:20 PM CST -----  Contact: [phone] or  [phone]  GORAN MCLEOD calling regarding Appointment Access  (message) Pt asking for a call back on another to get her surgery arrival time for her surgery schedule4 for tomorrow 12/17 Due to pt missed place her phone

## 2024-12-16 NOTE — TELEPHONE ENCOUNTER
No answer. Voice Mailbox full, unable toleave message. Pt portal messge sent with Pre-Op/ Post-Op info.

## 2024-12-16 NOTE — ANESTHESIA PREPROCEDURE EVALUATION
[facility]  Anesthesia Pre-Operative Evaluation         Patient Name: Geovanna Harvey  YOB: 1960  MRN: [id number]    SUBJECTIVE:     Pre-operative evaluation for Procedure(s) (LRB):  REPAIR, HERNIA, VENTRAL (N/A)     12/16/2024    Geovanna Harvey is a 64 y.o. female w/ significant PMHx of HTN.    Patient now presents for the above procedure(s).    No results found for this or any previous visit.       LDA: None documented.       Prev airway: None documented.    Drips: None documented.      There is no problem list on file for this patient.      Review of patient's allergies indicates:  No Known Allergies    Current Inpatient Medications:      Current Facility-Administered Medications on File Prior to Encounter   Medication Dose Route Frequency Provider Last Rate Last Admin    acetaminophen tablet 650 mg  650 mg Oral Once PRN Hever Hathaway MD        EPINEPHrine (EPIPEN) 0.3 mg/0.3 mL pen injection 0.3 mg  0.3 mg Intramuscular PRN Hever Hathaway MD         Current Outpatient Medications on File Prior to Encounter   Medication Sig Dispense Refill    citalopram (CELEXA) 40 MG tablet Take 40 mg by mouth once daily.      famotidine (PEPCID) 20 MG tablet TAKE one TABLET BY MOUTH TWICE DAILY 60 tablet 1    rizatriptan (MAXALT-MLT) 10 MG disintegrating tablet May repeat in 2 hours if needed 10 tablet 0    traZODone (DESYREL) 50 MG tablet Take 50 mg by mouth every evening.         Past Surgical History:   Procedure Laterality Date    CHOLECYSTECTOMY      HYSTERECTOMY         Social History     Socioeconomic History    Marital status:    Tobacco Use    Smoking status: Never     Passive exposure: Never    Smokeless tobacco: Never   Substance and Sexual Activity    Alcohol use: Not Currently    Drug use: Never   Social History Narrative    ** Merged History Encounter **            OBJECTIVE:     Vital Signs Range (Last 24H):         Significant  Labs:  Lab Results   Component Value Date    WBC 4.48 04/17/2024    HGB 13.4 04/17/2024    HCT 40.8 04/17/2024     04/17/2024    CHOL 166 04/17/2024    TRIG 91 04/17/2024    HDL 53 04/17/2024    ALT 13 04/17/2024    AST 18 04/17/2024     04/17/2024    K 4.0 04/17/2024     04/17/2024    CREATININE 0.9 04/17/2024    BUN 29 (H) 04/17/2024    CO2 28 04/17/2024    TSH 3.267 04/17/2024    HGBA1C 5.6 04/17/2024       Diagnostic Studies: No relevant studies.    EKG:   Results for orders placed or performed during the hospital encounter of 11/20/23   EKG 12-lead    Collection Time: 11/20/23  6:00 PM    Narrative    Test Reason : R07.9,    Vent. Rate : 083 BPM     Atrial Rate : 083 BPM     P-R Int : 146 ms          QRS Dur : 080 ms      QT Int : 374 ms       P-R-T Axes : 078 068 071 degrees     QTc Int : 439 ms    Normal sinus rhythm  Right atrial enlargement  Borderline Abnormal ECG  No previous ECGs available  Confirmed by Liam RING, Angelito DIAZ ([id number]) on 11/30/2023 9:22:27 PM    Referred By: [email or web address]   SELF           Confirmed By:Angelito Wheeler MD       2D ECHO:  TTE:  No results found for this or any previous visit.    MARSHALL:  No results found for this or any previous visit.    ASSESSMENT/PLAN:           Pre-op Assessment    I have reviewed the Patient Summary Reports.     I have reviewed the Nursing Notes. I have reviewed the NPO Status.   I have reviewed the Medications.     Review of Systems  Anesthesia Hx:  No problems with previous Anesthesia   History of prior surgery of interest to airway management or planning:          Denies Family Hx of Anesthesia complications.    Denies Personal Hx of Anesthesia complications.                    Social:  Non-Smoker, No Alcohol Use       Hematology/Oncology:       -- Denies Anemia:                                  Cardiovascular:     Hypertension    Denies CAD.        Denies CHF.                                   Pulmonary:    Denies COPD.  Denies Asthma.                     Hepatic/GI:      Denies GERD.                Neurological:    Denies CVA.    Denies Seizures.                                Endocrine:  Denies Diabetes.               Physical Exam  General: Well nourished    Airway:  Mallampati: I   Mouth Opening: Normal  TM Distance: Normal    Dental:  Edentulous    Chest/Lungs:  Normal Respiratory Rate    Heart:  Rate: Normal    Anesthesia Plan  Type of Anesthesia, risks & benefits discussed:    Anesthesia Type: Gen ETT  Intra-op Monitoring Plan: Standard ASA Monitors  Post Op Pain Control Plan: multimodal analgesia and IV/PO Opioids PRN  Induction:  IV  Airway Plan: Direct and Video, Post-Induction  ASA Score: 2  Day of Surgery Review of History & Physical: H&P Update referred to the surgeon/provider.    Ready For Surgery From Anesthesia Perspective.     .

## 2024-12-17 ENCOUNTER — ANESTHESIA (OUTPATIENT)
Dept: SURGERY | Facility: HOSPITAL | Age: 64
End: 2024-12-17
Payer: MEDICAID

## 2024-12-17 ENCOUNTER — HOSPITAL ENCOUNTER (OUTPATIENT)
Facility: HOSPITAL | Age: 64
Discharge: HOME | End: 2024-12-17
Attending: STUDENT IN AN ORGANIZED HEALTH CARE EDUCATION/TRAINING PROGRAM | Admitting: STUDENT IN AN ORGANIZED HEALTH CARE EDUCATION/TRAINING PROGRAM
Payer: MEDICAID

## 2024-12-17 ENCOUNTER — TELEPHONE (OUTPATIENT)
Dept: SURGERY | Facility: CLINIC | Age: 64
End: 2024-12-17
Payer: MEDICAID

## 2024-12-17 VITALS
TEMPERATURE: 98 F | OXYGEN SATURATION: 97 % | HEIGHT: 65 IN | BODY MASS INDEX: 26.12 KG/M2 | WEIGHT: 156.75 LBS | SYSTOLIC BLOOD PRESSURE: 125 MMHG | HEART RATE: 62 BPM | RESPIRATION RATE: 18 BRPM | DIASTOLIC BLOOD PRESSURE: 64 MMHG

## 2024-12-17 DIAGNOSIS — K43.9 EPIGASTRIC HERNIA: Primary | ICD-10-CM

## 2024-12-17 PROCEDURE — 63600175 PHARM REV CODE 636 W HCPCS

## 2024-12-17 PROCEDURE — 36000706: Performed by: STUDENT IN AN ORGANIZED HEALTH CARE EDUCATION/TRAINING PROGRAM

## 2024-12-17 PROCEDURE — 25000003 PHARM REV CODE 250

## 2024-12-17 PROCEDURE — 63600175 PHARM REV CODE 636 W HCPCS: Mod: JZ,JG | Performed by: STUDENT IN AN ORGANIZED HEALTH CARE EDUCATION/TRAINING PROGRAM

## 2024-12-17 PROCEDURE — 36000707: Performed by: STUDENT IN AN ORGANIZED HEALTH CARE EDUCATION/TRAINING PROGRAM

## 2024-12-17 PROCEDURE — 37000009 HC ANESTHESIA EA ADD 15 MINS: Performed by: STUDENT IN AN ORGANIZED HEALTH CARE EDUCATION/TRAINING PROGRAM

## 2024-12-17 PROCEDURE — 71000015 HC POSTOP RECOV 1ST HR: Performed by: STUDENT IN AN ORGANIZED HEALTH CARE EDUCATION/TRAINING PROGRAM

## 2024-12-17 PROCEDURE — 37000008 HC ANESTHESIA 1ST 15 MINUTES: Performed by: STUDENT IN AN ORGANIZED HEALTH CARE EDUCATION/TRAINING PROGRAM

## 2024-12-17 PROCEDURE — 71000016 HC POSTOP RECOV ADDL HR: Performed by: STUDENT IN AN ORGANIZED HEALTH CARE EDUCATION/TRAINING PROGRAM

## 2024-12-17 PROCEDURE — 71000044 HC DOSC ROUTINE RECOVERY FIRST HOUR: Performed by: STUDENT IN AN ORGANIZED HEALTH CARE EDUCATION/TRAINING PROGRAM

## 2024-12-17 PROCEDURE — 49592 RPR AA HRN 1ST < 3 NCR/STRN: CPT | Mod: ,,, | Performed by: STUDENT IN AN ORGANIZED HEALTH CARE EDUCATION/TRAINING PROGRAM

## 2024-12-17 RX ORDER — DEXMEDETOMIDINE HYDROCHLORIDE 100 UG/ML
INJECTION, SOLUTION INTRAVENOUS
Status: DISCONTINUED | OUTPATIENT
Start: 2024-12-17 | End: 2024-12-17

## 2024-12-17 RX ORDER — ROCURONIUM BROMIDE 10 MG/ML
INJECTION, SOLUTION INTRAVENOUS
Status: DISCONTINUED | OUTPATIENT
Start: 2024-12-17 | End: 2024-12-17

## 2024-12-17 RX ORDER — SODIUM CHLORIDE 0.9 % (FLUSH) 0.9 %
10 SYRINGE (ML) INJECTION
Status: DISCONTINUED | OUTPATIENT
Start: 2024-12-17 | End: 2024-12-17 | Stop reason: HOSPADM

## 2024-12-17 RX ORDER — PROPOFOL 10 MG/ML
VIAL (ML) INTRAVENOUS
Status: DISCONTINUED | OUTPATIENT
Start: 2024-12-17 | End: 2024-12-17

## 2024-12-17 RX ORDER — OXYCODONE HYDROCHLORIDE 5 MG/1
5 TABLET ORAL EVERY 6 HOURS PRN
Qty: 10 TABLET | Refills: 0 | Status: SHIPPED | OUTPATIENT
Start: 2024-12-17

## 2024-12-17 RX ORDER — DEXAMETHASONE SODIUM PHOSPHATE 4 MG/ML
INJECTION, SOLUTION INTRA-ARTICULAR; INTRALESIONAL; INTRAMUSCULAR; INTRAVENOUS; SOFT TISSUE
Status: DISCONTINUED | OUTPATIENT
Start: 2024-12-17 | End: 2024-12-17

## 2024-12-17 RX ORDER — OXYCODONE HYDROCHLORIDE 5 MG/1
5 TABLET ORAL
Status: DISCONTINUED | OUTPATIENT
Start: 2024-12-17 | End: 2024-12-17 | Stop reason: HOSPADM

## 2024-12-17 RX ORDER — CEFAZOLIN 2 G/1
2 INJECTION, POWDER, FOR SOLUTION INTRAMUSCULAR; INTRAVENOUS
Status: DISCONTINUED | OUTPATIENT
Start: 2024-12-17 | End: 2024-12-17 | Stop reason: HOSPADM

## 2024-12-17 RX ORDER — ACETAMINOPHEN 500 MG
1000 TABLET ORAL EVERY 8 HOURS
Status: DISCONTINUED | OUTPATIENT
Start: 2024-12-17 | End: 2024-12-17 | Stop reason: HOSPADM

## 2024-12-17 RX ORDER — PHENYLEPHRINE HCL IN 0.9% NACL 1 MG/10 ML
SYRINGE (ML) INTRAVENOUS
Status: DISCONTINUED | OUTPATIENT
Start: 2024-12-17 | End: 2024-12-17

## 2024-12-17 RX ORDER — LIDOCAINE HYDROCHLORIDE 20 MG/ML
INJECTION, SOLUTION EPIDURAL; INFILTRATION; INTRACAUDAL; PERINEURAL
Status: DISCONTINUED | OUTPATIENT
Start: 2024-12-17 | End: 2024-12-17

## 2024-12-17 RX ORDER — GLUCAGON 1 MG
1 KIT INJECTION
Status: DISCONTINUED | OUTPATIENT
Start: 2024-12-17 | End: 2024-12-17 | Stop reason: HOSPADM

## 2024-12-17 RX ORDER — MIDAZOLAM HYDROCHLORIDE 1 MG/ML
INJECTION INTRAMUSCULAR; INTRAVENOUS
Status: DISCONTINUED | OUTPATIENT
Start: 2024-12-17 | End: 2024-12-17

## 2024-12-17 RX ORDER — CEFAZOLIN SODIUM 1 G/3ML
INJECTION, POWDER, FOR SOLUTION INTRAMUSCULAR; INTRAVENOUS
Status: DISCONTINUED | OUTPATIENT
Start: 2024-12-17 | End: 2024-12-17

## 2024-12-17 RX ORDER — FENTANYL CITRATE 50 UG/ML
INJECTION, SOLUTION INTRAMUSCULAR; INTRAVENOUS
Status: DISCONTINUED | OUTPATIENT
Start: 2024-12-17 | End: 2024-12-17

## 2024-12-17 RX ORDER — BUPIVACAINE HYDROCHLORIDE 2.5 MG/ML
INJECTION, SOLUTION EPIDURAL; INFILTRATION; INTRACAUDAL
Status: DISCONTINUED | OUTPATIENT
Start: 2024-12-17 | End: 2024-12-17 | Stop reason: HOSPADM

## 2024-12-17 RX ORDER — ONDANSETRON HYDROCHLORIDE 2 MG/ML
4 INJECTION, SOLUTION INTRAVENOUS DAILY PRN
Status: DISCONTINUED | OUTPATIENT
Start: 2024-12-17 | End: 2024-12-17 | Stop reason: HOSPADM

## 2024-12-17 RX ORDER — HYDROMORPHONE HYDROCHLORIDE 1 MG/ML
0.2 INJECTION, SOLUTION INTRAMUSCULAR; INTRAVENOUS; SUBCUTANEOUS EVERY 5 MIN PRN
Status: DISCONTINUED | OUTPATIENT
Start: 2024-12-17 | End: 2024-12-17 | Stop reason: HOSPADM

## 2024-12-17 RX ADMIN — SUGAMMADEX 200 MG: 100 INJECTION, SOLUTION INTRAVENOUS at 10:12

## 2024-12-17 RX ADMIN — Medication 200 MCG: at 10:12

## 2024-12-17 RX ADMIN — ROCURONIUM BROMIDE 50 MG: 10 INJECTION, SOLUTION INTRAVENOUS at 09:12

## 2024-12-17 RX ADMIN — HYDROMORPHONE HYDROCHLORIDE 0.2 MG: 1 INJECTION, SOLUTION INTRAMUSCULAR; INTRAVENOUS; SUBCUTANEOUS at 11:12

## 2024-12-17 RX ADMIN — MIDAZOLAM HYDROCHLORIDE 2 MG: 2 INJECTION, SOLUTION INTRAMUSCULAR; INTRAVENOUS at 09:12

## 2024-12-17 RX ADMIN — CEFAZOLIN 2 G: 330 INJECTION, POWDER, FOR SOLUTION INTRAMUSCULAR; INTRAVENOUS at 09:12

## 2024-12-17 RX ADMIN — FENTANYL CITRATE 75 MCG: 50 INJECTION, SOLUTION INTRAMUSCULAR; INTRAVENOUS at 09:12

## 2024-12-17 RX ADMIN — PROPOFOL 150 MG: 10 INJECTION, EMULSION INTRAVENOUS at 09:12

## 2024-12-17 RX ADMIN — LIDOCAINE HYDROCHLORIDE 100 MG: 20 INJECTION, SOLUTION EPIDURAL; INFILTRATION; INTRACAUDAL; PERINEURAL at 09:12

## 2024-12-17 RX ADMIN — DEXAMETHASONE SODIUM PHOSPHATE 8 MG: 4 INJECTION, SOLUTION INTRAMUSCULAR; INTRAVENOUS at 09:12

## 2024-12-17 RX ADMIN — OXYCODONE 5 MG: 5 TABLET ORAL at 11:12

## 2024-12-17 RX ADMIN — DEXMEDETOMIDINE 12 MCG: 100 INJECTION, SOLUTION, CONCENTRATE INTRAVENOUS at 09:12

## 2024-12-17 RX ADMIN — FENTANYL CITRATE 25 MCG: 50 INJECTION, SOLUTION INTRAMUSCULAR; INTRAVENOUS at 09:12

## 2024-12-17 NOTE — TELEPHONE ENCOUNTER
----- Message from Ed sent at 12/17/2024  3:20 PM CST -----  Type:  Patient Returning Call    Who Called:PT  Who Left Message for Patient:  Does the patient know what this is regarding?:instructions for procedure  Would the patient rather a call back or a response via [facility]? call  Best Call Back Number: [phone]  Additional Information: Pt states they would like a call back from Suni if possible to go over procedure instructions one more time. Thank you

## 2024-12-17 NOTE — PLAN OF CARE
Chart reviewed. Preop nursing care completed per orders. Safe surgery checklist complete aside from H&P update and anesthesia consent. Daughter at bedside and to take belongings. Call bell within reach. Instructed pt to call for assistance.

## 2024-12-17 NOTE — DISCHARGE INSTRUCTIONS
Postoperative General Instructions    What to Expect:  It is normal to experience pain and swelling at the surgical site.  The pain usually decreases significantly after the first week but may last for many weeks.  Each day, the pain should be similar or better to the previous day. If it worsens, call the doctor's office.     Activity:  You should walk beginning on the day of surgery and increase activity slowly over the next two to four weeks.  Do not drive while taking prescription pain medication.  You may return to work when you feel ready.  Do not lift anything heavier than 10 lbs for 6 weeks     Wound Care:  You may shower. Let soap and water run over the incisions and pat dry. Do not submerge in a bathtub or pool.  If you have an open wound, you should change the dressing twice daily with moist gauze.  Leave your dressing on for two days     Diet:  You may resume your normal diet postoperatively.  Take a stool softener or laxative to avoid constipation.     Medication:  Pain Control  Take acetaminophen (Tylenol) 650 mg 4 times daily as needed for pain.  Take ibuprofen 600 mg 3 times daily as needed for pain. Stop taking this medication if it causes an upset stomach.  Take the prescribed oxycodone as needed if you have pain that is not controlled by acetaminophen and ibuprofen.  Bowel Regularity  Take an over-the-counter stool softener/laxative (Colace, Miralax, or Milk of Magnesia) to avoid constipation.  Previous Home Medication  Restart your previous home medication unless otherwise instructed.    Call Your Doctor's Office If You Experience:  Fevers greater than 101.3°F.  Vomiting.  Spreading redness or drainage from the incisions.  Opening of the incisions.  Worsening pain not controlled by medication.  Chest pain or shortness of breath.    Follow-Up:  Follow-up with your surgeon as scheduled in two weeks.  If no follow-up appointment has been scheduled, call to schedule an appointment within 1-2 weeks of  discharge.     Contact Information:  During normal business hours call the clinic with any questions or concerns. On weekends and evenings call [phone] to have the operators page the surgery resident on call after hours with questions or concerns.

## 2024-12-17 NOTE — PROGRESS NOTES
Pt inquiring if she should take ordered stool softner. Advised to take as needed and to prevent the constipation that can be caused by the pain meds given during procedure and post-op. If stools become too loose, she may decrease use of stool softner. Voiced understanding.

## 2024-12-17 NOTE — ANESTHESIA PROCEDURE NOTES
Intubation    Date/Time: 12/17/2024 9:45 AM    Performed by: Kendall Givens MD  Authorized by: Siena Marie MD    Intubation:     Induction:  Intravenous    Mask Ventilation:  Easy mask    Attempts:  1    Attempted By:  Resident anesthesiologist    Method of Intubation:  Direct    Blade:  Halina 3    Laryngeal View Grade: Grade I - full view of cords      Difficult Airway Encountered?: No      Complications:  None    Airway Device:  Oral endotracheal tube    Airway Device Size:  7.0    Style/Cuff Inflation:  Cuffed    Inflation Amount (mL):  6    Tube secured:  22    Secured at:  The lips    Placement Verified By:  Capnometry    Complicating Factors:  None    Findings Post-Intubation:  BS equal bilateral

## 2024-12-17 NOTE — BRIEF OP NOTE
[facility] - [facility] ([facility])  Brief Operative Note    Surgery Date: 12/17/2024     Surgeons and Role:     * Jose Alberto Gutiérrez MD - Primary     * Nelson Evans MD - Resident - Assisting        Pre-op Diagnosis:  Epigastric hernia [K43.9]    Post-op Diagnosis:  Post-Op Diagnosis Codes:     * Epigastric hernia [K43.9]    Procedure(s) (LRB):  REPAIR, HERNIA, VENTRAL (N/A)    Anesthesia: General    Operative Findings: 1.5 cm fat containing epigastric hernia repaired primarily.    Estimated Blood Loss: Minimal         Specimens:   Specimen (24h ago, onward)      None              Discharge Note    OUTCOME: Patient tolerated treatment/procedure well without complication and is now ready for discharge.    DISPOSITION: Home or Self Care    FINAL DIAGNOSIS:  Same    FOLLOWUP: In clinic    DISCHARGE INSTRUCTIONS:    Discharge Procedure Orders   No driving until:   Order Comments: No longer taking narcotic pain medication and can turn around safely without pain.     Lifting restrictions   Order Comments: Do not lift anything >10 lbs (about a gallon of milk) for 6 weeks     Notify your health care provider if you experience any of the following:  temperature >100.4     Notify your health care provider if you experience any of the following:  persistent nausea and vomiting or diarrhea     Notify your health care provider if you experience any of the following:  severe uncontrolled pain     Notify your health care provider if you experience any of the following:  redness, tenderness, or signs of infection (pain, swelling, redness, odor or green/yellow discharge around incision site)     Notify your health care provider if you experience any of the following:  difficulty breathing or increased cough     Notify your health care provider if you experience any of the following:  severe persistent headache     Notify your health care provider if you experience any of the following:  worsening rash     Notify your health care provider if  you experience any of the following:  persistent dizziness, light-headedness, or visual disturbances     Notify your health care provider if you experience any of the following:  increased confusion or weakness     Remove dressing in 48 hours   Order Comments: Okay to shower the day after surgery. Please do not submerge wounds underwater (no bath, no pool, no lake, etc.) for at least 2 weeks.     Activity as tolerated

## 2024-12-17 NOTE — TRANSFER OF CARE
"Anesthesia Transfer of Care Note    Patient: Geovanna Harvey    Procedure(s) Performed: Procedure(s) (LRB):  REPAIR, HERNIA, VENTRAL (N/A)    Patient location: PACU    Anesthesia Type: general    Transport from OR: Transported from OR on 2-3 L/min O2 by NC with adequate spontaneous ventilation    Post pain: adequate analgesia    Post assessment: no apparent anesthetic complications    Post vital signs: stable    Level of consciousness: awake and alert    Nausea/Vomiting: no nausea/vomiting    Complications: none    Transfer of care protocol was followed      Last vitals: Visit Vitals  BP (!) 153/72 (BP Location: Right arm, Patient Position: Lying)   Pulse 71   Temp 36.5 °C (97.7 °F) (Temporal)   Resp 18   Ht 5' 5" (1.651 m)   Wt 71.1 kg (156 lb 12 oz)   SpO2 100%   Breastfeeding No   BMI 26.08 kg/m²     "

## 2024-12-18 NOTE — ANESTHESIA POSTPROCEDURE EVALUATION
Anesthesia Post Evaluation    Patient: Geovanna Harvey    Procedure(s) Performed: Procedure(s) (LRB):  REPAIR, HERNIA, VENTRAL (N/A)    Final Anesthesia Type: general      Patient location during evaluation: PACU  Patient participation: Yes- Able to Participate  Level of consciousness: awake and alert  Post-procedure vital signs: reviewed and stable  Pain management: adequate  Airway patency: patent    PONV status at discharge: No PONV  Anesthetic complications: no      Cardiovascular status: hemodynamically stable  Respiratory status: unassisted, spontaneous ventilation and room air  Hydration status: euvolemic  Follow-up not needed.          Vitals Value Taken Time   /64 12/17/24 1230   Temp 36.8 °C (98.2 °F) 12/17/24 1230   Pulse 62 12/17/24 1230   Resp 18 12/17/24 1230   SpO2 97 % 12/17/24 1230         No case tracking events are documented in the log.      Pain/Kimi Score: Pain Rating Prior to Med Admin: 8 (12/17/2024 11:45 AM)  Kimi Score: 10 (12/17/2024 12:30 PM)

## 2024-12-18 NOTE — OP NOTE
[facility]  General Surgery  Operative Note    DATE: 12/17/2024    PREOPERATIVE DIAGNOSIS: Epigastric hernia [K43.9]     POSTOPERATIVE DIAGNOSIS: Epigastric hernia [K43.9]     Procedure(s):  REPAIR, HERNIA, VENTRAL, INCARCERATED, <3CM IN SIZE     Surgeons and Role:     * Jose Alberto Gutiérrez MD - Primary     * Nelson Evans MD - Resident - Assisting    ANESTHESIA: General endotracheal anesthesia    FINDINGS: reducible epigastric defect with incarcerated preperitoneal fat measuring around 2cm in size.    INDICATION: Ms. Harvey is a 64 y.o. female who presented to clinic with ongoing complaints of vague abdominal pain and increased bulging in the epigastric region from known abdominal wall defect. We discussed repair with the patient, and due to the associated discomfort, she wished to go forward with repair.     PROCEDURE IN DETAIL: Patient was brought to the operating room where she was placed in supine position on the operating room table and underwent general anesthesia with endotracheal intubation without complication. The field was sterilely prepped out and draped in standard fashion, and a timeout identifying the correct patient, placement, procedure, and preoperative antibiotics was called with everyone in agreement.    A longitudinal skin incision overlying the defect was made using a 10-blade scalpel. The incision was dissected down through the fat using electrocautery. Using blunt dissection, electrocautery, and a tonsil clamp, the defect was identified and circumferentially dissected free. The adipose tissue protruding through the defect was then seperated from the fascia of the abdominal wall by electrocautery. The adipose tissue was then amputated at the base by electrocautery and passed off the field. The remainder of the hernia contents were then placed back into the abdomen. The defect was manually inspected to ensure there was no bowel adherent or adhesions to the fascia in the  area. The defect was measured to be ~2cm in size, and decision was made to repair the defect primarily with multiple interrupted PDS sutures. The wound was then irrigated and inspected for bleeding. Hemostasis was achieved using electrocautery. The deep dermis was then re-approximated using 3-0 vicryl suture laid in a deep dermal interrupted fashion. The skin was then closed using 4-0 monocryl suture in a running subcuticular fashion, cleaned with a wet-to-dry and then covered with dermabond.     This completed the proposed operation. All instruments, needles, and sponge counts were reported as correct x2 by the nursing staff. Patient was extubated and awakened from general anesthesia without complication. She was sent to the recovery unit in stable condition.     EBL: <20mL.    COMPLICATIONS: none apparent.    SPECIMEN: adipose tissue and hernia sac for permanent     DRAINS: none    DISPOSITION: PACU.    ATTESTATION:  I was present and scrubbed for the entire procedure including all critical portions of the procedure.    Jose Alberto Gutiérrez MD  Acute Care Surgery and Surgical Critical Care  [facility]  12/17/2024

## 2025-01-01 DIAGNOSIS — R12 HEARTBURN: ICD-10-CM

## 2025-01-02 RX ORDER — FAMOTIDINE 20 MG/1
20 TABLET, FILM COATED ORAL 2 TIMES DAILY
Qty: 60 TABLET | Refills: 1 | Status: SHIPPED | OUTPATIENT
Start: 2025-01-02

## 2025-01-09 ENCOUNTER — OFFICE VISIT (OUTPATIENT)
Dept: SURGERY | Facility: CLINIC | Age: 65
End: 2025-01-09
Payer: MEDICAID

## 2025-01-09 VITALS
SYSTOLIC BLOOD PRESSURE: 115 MMHG | BODY MASS INDEX: 27.39 KG/M2 | OXYGEN SATURATION: 98 % | HEART RATE: 76 BPM | DIASTOLIC BLOOD PRESSURE: 69 MMHG | WEIGHT: 164.38 LBS | HEIGHT: 65 IN

## 2025-01-09 DIAGNOSIS — Z87.19 S/P HERNIA REPAIR: Primary | ICD-10-CM

## 2025-01-09 DIAGNOSIS — Z98.890 S/P HERNIA REPAIR: Primary | ICD-10-CM

## 2025-01-09 PROCEDURE — 99999 PR PBB SHADOW E&M-EST. PATIENT-LVL III: CPT | Mod: PBBFAC,,, | Performed by: STUDENT IN AN ORGANIZED HEALTH CARE EDUCATION/TRAINING PROGRAM

## 2025-01-09 PROCEDURE — 99213 OFFICE O/P EST LOW 20 MIN: CPT | Mod: PBBFAC | Performed by: STUDENT IN AN ORGANIZED HEALTH CARE EDUCATION/TRAINING PROGRAM

## 2025-01-09 NOTE — PROGRESS NOTES
[facility]  General Surgery  Follow Up Clinic Note    Subjective:     Geovanna Harvey is a 64 y.o. female who presents to clinic for follow up s/p ventral hernia repair 12/17/24. Pt reports that they are feeling well. Tolerating a regular diet and having regular bowel movements. Denies fever, chills, chest pain, and shortness of breath. No pain. Denies redness or drainage of incision. Patient expresses a lot of satisfaction with surgery.    Medications:  Current Outpatient Medications on File Prior to Visit   Medication Sig Dispense Refill    citalopram (CELEXA) 40 MG tablet Take 40 mg by mouth once daily.      famotidine (PEPCID) 20 MG tablet TAKE one TABLET BY MOUTH TWICE DAILY 60 tablet 1    traZODone (DESYREL) 50 MG tablet Take 50 mg by mouth every evening.      oxyCODONE (ROXICODONE) 5 MG immediate release tablet Take 1 tablet (5 mg total) by mouth every 6 (six) hours as needed for Pain. (Patient not taking: Reported on 1/9/2025) 10 tablet 0    rizatriptan (MAXALT-MLT) 10 MG disintegrating tablet May repeat in 2 hours if needed (Patient not taking: Reported on 1/9/2025) 10 tablet 0     Current Facility-Administered Medications on File Prior to Visit   Medication Dose Route Frequency Provider Last Rate Last Admin    acetaminophen tablet 650 mg  650 mg Oral Once PRN Hever Hathaway MD             Objective:     PHYSICAL EXAM:  Vital Signs (Most Recent)  Pulse: 76 (01/09/25 0943)  BP: 115/69 (01/09/25 0943)  SpO2: 98 % (01/09/25 0943)    Physical Exam:  Gen: no apparent distress, awake and alert  CV: regular rate/rhythm  Pulm: non-labored breathing, equal and bilateral chest rise  Abd: soft, non-distended, non-tender  Ext: warm and well perfused, no edema  Skin: warm and dry, no lesions appreciated  Incision: c/d/I, no surrounding erythema or edema  Neuro: motor and sensation grossly intact and symmetric         Assessment:     Geovanna Harvey is a 64 y.o. female presenting to  clinic today for follow up s/p ventral hernia repair DOS 12/17/24. Recovering well. Returning to normal function. Very satisfied with the surgery.    Plan:     - Continue to monitor incision for signs of infection  - Patient is recovering well  - Continue with lifting restrictions for 4 more weeks  - RTC MICHAEL Mijares DPM  General Surgery

## 2025-02-21 DIAGNOSIS — R12 HEARTBURN: ICD-10-CM

## 2025-02-21 RX ORDER — FAMOTIDINE 20 MG/1
20 TABLET, FILM COATED ORAL 2 TIMES DAILY
Qty: 60 TABLET | Refills: 1 | Status: SHIPPED | OUTPATIENT
Start: 2025-02-21

## 2025-04-18 DIAGNOSIS — R12 HEARTBURN: ICD-10-CM

## 2025-04-21 RX ORDER — FAMOTIDINE 20 MG/1
20 TABLET, FILM COATED ORAL 2 TIMES DAILY
Qty: 60 TABLET | Refills: 1 | Status: SHIPPED | OUTPATIENT
Start: 2025-04-21

## 2025-07-16 ENCOUNTER — PATIENT MESSAGE (OUTPATIENT)
Dept: ADMINISTRATIVE | Facility: HOSPITAL | Age: 65
End: 2025-07-16
Payer: MEDICAID

## 2025-07-28 DIAGNOSIS — Z00.00 ENCOUNTER FOR MEDICARE ANNUAL WELLNESS EXAM: ICD-10-CM

## (undated) DEVICE — GAUZE SPONGE 4X4 12PLY

## (undated) DEVICE — ADHESIVE DERMABOND ADVANCED

## (undated) DEVICE — DRESSING TRANS 4X4 TEGADERM

## (undated) DEVICE — TIP YANKAUERS BULB NO VENT

## (undated) DEVICE — APPLICATOR CHLORAPREP ORN 26ML

## (undated) DEVICE — TRAY MINOR GEN SURG OMC

## (undated) DEVICE — ELECTRODE REM PLYHSV RETURN 9

## (undated) DEVICE — DRAPE ABDOMINAL TIBURON 14X11